# Patient Record
Sex: MALE | Race: WHITE | NOT HISPANIC OR LATINO | URBAN - METROPOLITAN AREA
[De-identification: names, ages, dates, MRNs, and addresses within clinical notes are randomized per-mention and may not be internally consistent; named-entity substitution may affect disease eponyms.]

---

## 2020-06-08 ENCOUNTER — EMERGENCY (EMERGENCY)
Facility: HOSPITAL | Age: 65
LOS: 1 days | Discharge: HOME | End: 2020-06-08
Attending: EMERGENCY MEDICINE
Payer: MEDICARE

## 2020-06-08 VITALS — TEMPERATURE: 100 F

## 2020-06-08 VITALS
TEMPERATURE: 99 F | DIASTOLIC BLOOD PRESSURE: 70 MMHG | SYSTOLIC BLOOD PRESSURE: 130 MMHG | RESPIRATION RATE: 18 BRPM | WEIGHT: 199.96 LBS | HEART RATE: 101 BPM | HEIGHT: 71 IN | OXYGEN SATURATION: 98 %

## 2020-06-08 LAB
ALBUMIN SERPL ELPH-MCNC: 4.1 G/DL — SIGNIFICANT CHANGE UP (ref 3.3–5)
ALP SERPL-CCNC: 76 U/L — SIGNIFICANT CHANGE UP (ref 40–120)
ALT FLD-CCNC: 9 U/L — LOW (ref 10–45)
ANION GAP SERPL CALC-SCNC: 12 MMOL/L — SIGNIFICANT CHANGE UP (ref 5–17)
AST SERPL-CCNC: 10 U/L — SIGNIFICANT CHANGE UP (ref 10–40)
BASOPHILS # BLD AUTO: 0.02 K/UL — SIGNIFICANT CHANGE UP (ref 0–0.2)
BASOPHILS NFR BLD AUTO: 0.2 % — SIGNIFICANT CHANGE UP (ref 0–2)
BILIRUB SERPL-MCNC: 0.4 MG/DL — SIGNIFICANT CHANGE UP (ref 0.2–1.2)
BUN SERPL-MCNC: 24 MG/DL — HIGH (ref 7–23)
CALCIUM SERPL-MCNC: 9.3 MG/DL — SIGNIFICANT CHANGE UP (ref 8.4–10.5)
CHLORIDE SERPL-SCNC: 102 MMOL/L — SIGNIFICANT CHANGE UP (ref 96–108)
CO2 SERPL-SCNC: 25 MMOL/L — SIGNIFICANT CHANGE UP (ref 22–31)
CREAT SERPL-MCNC: 0.81 MG/DL — SIGNIFICANT CHANGE UP (ref 0.5–1.3)
CRP SERPL-MCNC: 7.17 MG/DL — HIGH (ref 0–0.4)
EOSINOPHIL # BLD AUTO: 0.61 K/UL — HIGH (ref 0–0.5)
EOSINOPHIL NFR BLD AUTO: 5.5 % — SIGNIFICANT CHANGE UP (ref 0–6)
GLUCOSE SERPL-MCNC: 205 MG/DL — HIGH (ref 70–99)
HCT VFR BLD CALC: 41.7 % — SIGNIFICANT CHANGE UP (ref 39–50)
HGB BLD-MCNC: 13.7 G/DL — SIGNIFICANT CHANGE UP (ref 13–17)
IMM GRANULOCYTES NFR BLD AUTO: 0.5 % — SIGNIFICANT CHANGE UP (ref 0–1.5)
LYMPHOCYTES # BLD AUTO: 19.7 % — SIGNIFICANT CHANGE UP (ref 13–44)
LYMPHOCYTES # BLD AUTO: 2.18 K/UL — SIGNIFICANT CHANGE UP (ref 1–3.3)
MCHC RBC-ENTMCNC: 29.5 PG — SIGNIFICANT CHANGE UP (ref 27–34)
MCHC RBC-ENTMCNC: 32.9 GM/DL — SIGNIFICANT CHANGE UP (ref 32–36)
MCV RBC AUTO: 89.7 FL — SIGNIFICANT CHANGE UP (ref 80–100)
MONOCYTES # BLD AUTO: 0.87 K/UL — SIGNIFICANT CHANGE UP (ref 0–0.9)
MONOCYTES NFR BLD AUTO: 7.9 % — SIGNIFICANT CHANGE UP (ref 2–14)
NEUTROPHILS # BLD AUTO: 7.3 K/UL — SIGNIFICANT CHANGE UP (ref 1.8–7.4)
NEUTROPHILS NFR BLD AUTO: 66.2 % — SIGNIFICANT CHANGE UP (ref 43–77)
NRBC # BLD: 0 /100 WBCS — SIGNIFICANT CHANGE UP (ref 0–0)
PLATELET # BLD AUTO: 154 K/UL — SIGNIFICANT CHANGE UP (ref 150–400)
POTASSIUM SERPL-MCNC: 4.2 MMOL/L — SIGNIFICANT CHANGE UP (ref 3.5–5.3)
POTASSIUM SERPL-SCNC: 4.2 MMOL/L — SIGNIFICANT CHANGE UP (ref 3.5–5.3)
PROT SERPL-MCNC: 7 G/DL — SIGNIFICANT CHANGE UP (ref 6–8.3)
RBC # BLD: 4.65 M/UL — SIGNIFICANT CHANGE UP (ref 4.2–5.8)
RBC # FLD: 14.3 % — SIGNIFICANT CHANGE UP (ref 10.3–14.5)
SODIUM SERPL-SCNC: 139 MMOL/L — SIGNIFICANT CHANGE UP (ref 135–145)
WBC # BLD: 11.04 K/UL — HIGH (ref 3.8–10.5)
WBC # FLD AUTO: 11.04 K/UL — HIGH (ref 3.8–10.5)

## 2020-06-08 PROCEDURE — 99284 EMERGENCY DEPT VISIT MOD MDM: CPT | Mod: 25

## 2020-06-08 PROCEDURE — 73630 X-RAY EXAM OF FOOT: CPT

## 2020-06-08 PROCEDURE — 85027 COMPLETE CBC AUTOMATED: CPT

## 2020-06-08 PROCEDURE — 86140 C-REACTIVE PROTEIN: CPT

## 2020-06-08 PROCEDURE — 73630 X-RAY EXAM OF FOOT: CPT | Mod: 26,RT

## 2020-06-08 PROCEDURE — 11042 DBRDMT SUBQ TIS 1ST 20SQCM/<: CPT | Mod: RT

## 2020-06-08 PROCEDURE — 99284 EMERGENCY DEPT VISIT MOD MDM: CPT | Mod: GC

## 2020-06-08 PROCEDURE — 82962 GLUCOSE BLOOD TEST: CPT

## 2020-06-08 PROCEDURE — 87040 BLOOD CULTURE FOR BACTERIA: CPT

## 2020-06-08 PROCEDURE — 80053 COMPREHEN METABOLIC PANEL: CPT

## 2020-06-08 NOTE — ED PROVIDER NOTE - PMH
CAD (coronary artery disease)  s/p CABGx1 2009 (LAD)  Diabetes    HLD (hyperlipidemia)    HTN (hypertension)    Marijuana smoker  nightly  Obesity    Smoking  current-[id number]  Spinal stenosis  L5-S1

## 2020-06-08 NOTE — ED PROVIDER NOTE - PHYSICAL EXAMINATION
Gen: No acute distress, alert, cooperative  HENT: Normocephalic, atraumatic.   Eyes: PERRL, EOMI    Resp: CTAB, non-labored, no wheeze  CV: rrr, no murmur  Abd: soft, NTND, no rebound or guarding  MSK: No CVAT bilaterally, no midline ttp  Skin: warm and dry, 2cm x2cm area of firm callous like changes with darkening of skin. No surrounding erythema. Patient unable to feel bottom of foot. No pain with palpation. Erythema to top/lateral side of foot on the right. Patient also has callous in the exact same spot on the left foot, but smaller and no erythema to foot.   Neuro: AOx3, speech is fluent and appropriate, no focal deficits(numbness to bottom of feet, chronic)  Psych: Normal affect

## 2020-06-08 NOTE — ED ADULT NURSE NOTE - OBJECTIVE STATEMENT
Pt is a 65y Male c/o open wound on bottom of R foot with blood in between the pt's 3rd and 4th digits on R foot. Pt PMHx DM with neuropathy, CAD, HTN, HLD, obesity. Pt went to the podiatrist (Raghavendra Tarango) monthly (last time he went was 2 weeks ago). Pt states he has the calluses on the bottom of his feet (callus is in the same place on both feet) shaved down. Pt states he didn't notice this broken skin until today when his daughter noticed it. Pt has reddened top of foot. Pt went to urgent care and was referred here. Pt denies pain, SOB, NVD, dizziness, cough, sick contacts. Pt prefers to go by . Pt resting comfortably in bed with MD at bedside. Pt educated on call bell use and call bell placed at bedside. Pt safety maintained.

## 2020-06-08 NOTE — ED ADULT NURSE REASSESSMENT NOTE - NS ED NURSE REASSESS COMMENT FT1
Pt reporting shivers to RN, MD Cho at bedside. Pt rectal temp 99.6F. 18 gauge placed to left forearm.

## 2020-06-08 NOTE — ED PROVIDER NOTE - OBJECTIVE STATEMENT
66yo M hx DM, diabetic neuropathy presenting with possible foot ulcer. Patient noticed larger callous/ulcer to bottom of right foot just today. Went to urgent care and told to come to ER for evaluation. 66yo M hx DM, diabetic neuropathy presenting with possible foot ulcer. Patient noticed larger callous/ulcer to bottom of right foot just today. Went to urgent care and told to come to ER for evaluation. No fevers. No pain bottom of foot. 66yo M hx DM, diabetic neuropathy presenting with possible foot ulcer. Patient noticed larger callous/ulcer to bottom of right foot just today. Went to urgent care and told to come to ER for evaluation. No fevers. No pain bottom of foot.    Attendinyo male presents with ulceration to the right of the bottom foot.  pt has no sensation to the foot.  no fever.  no drainage from the wound.  pt sees a podiatrist monthly and this was not noted on last visit about 3 weeks ago.

## 2020-06-08 NOTE — CONSULT NOTE ADULT - SUBJECTIVE AND OBJECTIVE BOX
Attending:    Patient is a 65y old  Male who presents with a chief complaint of     HPI:    Review of systems negative except per HPI    PAST MEDICAL & SURGICAL HISTORY:  Marijuana smoker: nightly  Smoking: current-[id number]  Obesity  Spinal stenosis: L5-S1  Diabetes  HLD (hyperlipidemia)  HTN (hypertension)  CAD (coronary artery disease): s/p CABGx1 2009 (LAD)  S/P spinal fusion: 2007    Home Medications:  Aspirin Enteric Coated 81 mg oral delayed release tablet: 1 tab(s) orally once a day (09 Sep 2015 14:21)  Crestor:  orally  (09 Sep 2015 14:21)  Humalog 100 units/mL subcutaneous solution: 10 unit(s) subcutaneous once a day (09 Sep 2015 14:21)  Lantus Solostar Pen 100 units/mL subcutaneous solution: 50 unit(s) subcutaneous once a day (09 Sep 2015 14:21)  metFORMIN: 2000  orally once a day (09 Sep 2015 14:21)  metoprolol succinate 50 mg oral tablet, extended release: 1 tab(s) orally once a day (09 Sep 2015 14:21)  Onglyza 5 mg oral tablet: 1 tab(s) orally once a day (09 Sep 2015 14:21)  ramipril 2.5 mg oral capsule: 1 cap(s) orally once a day (09 Sep 2015 14:21)    Allergies    No Known Allergies    Intolerances      FAMILY HISTORY:    Social History:       LABS                        13.7   11.04 )-----------( 154      ( 08 Jun 2020 21:17 )             41.7     06-08    139  |  102  |  24<H>  ----------------------------<  205<H>  4.2   |  25  |  0.81    Ca    9.3      08 Jun 2020 21:17    TPro  7.0  /  Alb  4.1  /  TBili  0.4  /  DBili  x   /  AST  10  /  ALT  9<L>  /  AlkPhos  76  06-08        Vital Signs Last 24 Hrs  T(C): 37.6 (08 Jun 2020 21:05), Max: 37.6 (08 Jun 2020 21:00)  T(F): 99.6 (08 Jun 2020 21:05), Max: 99.6 (08 Jun 2020 21:00)  HR: 90 (08 Jun 2020 21:00) (90 - 101)  BP: 117/64 (08 Jun 2020 21:00) (117/64 - 130/70)  BP(mean): --  RR: 18 (08 Jun 2020 21:00) (18 - 18)  SpO2: 98% (08 Jun 2020 21:00) (98% - 98%)    PHYSICAL EXAM  General: NAD, AA0x3    Lower Extremity Focused:  Vasc: DP/PT 1/4 b/l, TG warm to cool, R foot mildly edematous,   Neuro: Sensation diminished to level of midfoot b/l   MSK: anterior cavus b/l  Derm: mild forefoot erythema/edema, hyperkeratotic wound sub met 3-4 RF, no drainage, no fluctuance, no crepitation     Wound #1:  Location: RF sub met 3-4  Size: 1.0 cm x 1.0 cm   Etiology: diabetic neuropathy/ chronic pressure   Depth: to subq  Wound bed: fibrogranular   Drainage: none   Odor: mild malodor   Periwound: hyperkeratotic     RADIOLOGY    < from: Xray Foot AP + Lateral + Oblique, Right (06.08.20 @ 20:29) >  EXAM:  FOOT COMPLETE RIGHT (MIN 3 VIEW)                            PROCEDURE DATE:  06/08/2020            INTERPRETATION:  CLINICAL INFORMATION: Right foot pain, evaluate for osteomyelitis.    EXAM: 3 views of the right foot with no prior comparisons.    FINDINGS/  IMPRESSION:  No acute displaced fracture or dislocation. No definite cortical erosions or periosteal reactions to suggest acute osteomyelitis. If there is high clinical suspicion for osteomyelitis, MRI is more sensitive for evaluation.    Posterior calcaneal enthesophyte. Mild diffuse soft tissue swelling. No radiopaque foreign bodies are seen. Vascular calcifications.                ROSINA SANDOVAL M.D., RADIOLOGY RESIDENT  This document has been electronically signed.  ROSINA PEREZ, ATTENDING RADIOLOGIST  This document has been electronically signed. Jun 8 2020  9:01PM              < end of copied text >

## 2020-06-08 NOTE — ED PROVIDER NOTE - CLINICAL SUMMARY MEDICAL DECISION MAKING FREE TEXT BOX
Presents with callous to bottom of foot, partially unroofed, no drainage, no surrounding erythema. Erythema to top/lateral side of foot, likely start of cellulitis. Will get xray, podiatry consult for possible debridement.

## 2020-06-08 NOTE — CONSULT NOTE ADULT - ASSESSMENT
66 y/o M w/ RF sub met 3-4 ulcer to subq    - pt seen in ed and evaluated  - T 99, WBC 11  - RF XR neg for evidence of OM or subq gas  - RF sub met 3-4 diabetic ulcer to subq w/ localized erythema/ edema, mild malodor, no purulence or drainage, no fluctuance or crepitation, no probe to bone   - aseptic excisional debridement of hyperkeratotic lesion down to subq and not beyond utilizing sterile # 15 blade  - applied betadine and DSD  - dispensed surgical shoe   - WBAT to R foot in surgical shoe with weight to heel   - recommend daily dressing changes with mupirocin, however pt insisted on leaving dressing clean, dry and intact until Thursday against advice   - pod stable for d/c on PO Augmentin x 10 days   - pt to return to ED immediately if he notices worsening appearance or constitutional symptoms   - to f/u Thursday with Dr. Srinivasan [phone]  - discussed w/ attending

## 2020-06-08 NOTE — ED PROVIDER NOTE - NSFOLLOWUPINSTRUCTIONS_ED_ALL_ED_FT
-Follow-up with your Podiatrist in the next 3-5 days  -Return to the Emergency Department for any worsening or concerning symptoms such as fevers, severe pain, trouble breathing, weakness or lightheadedness.  -Pickup any prescriptions prescribed to you and take as directed. An antibiotic was sent to your pharmacy.

## 2020-06-08 NOTE — ED ADULT NURSE NOTE - PMH
CAD (coronary artery disease)  s/p CABGx1 2009 (LAD)  Diabetes    HLD (hyperlipidemia)    HTN (hypertension)    Marijuana smoker  nightly  Obesity    Smoking  current-[id number]  Spinal stenosis  L5-S1

## 2020-06-08 NOTE — ED PROVIDER NOTE - PROGRESS NOTE DETAILS
AK: Second page to podiatry. AK: Foot debrided bedside. Agree with podiatry recs for outpt f/u and oral abx.

## 2020-06-08 NOTE — ED PROVIDER NOTE - PATIENT PORTAL LINK FT
You can access the FollowMyHealth Patient Portal offered by [facility] by registering at the following website: [email or web address]. By joining [facility]’s FollowMyHealth portal, you will also be able to view your health information using other applications (apps) compatible with our system.

## 2020-06-14 LAB
CULTURE RESULTS: SIGNIFICANT CHANGE UP
CULTURE RESULTS: SIGNIFICANT CHANGE UP
SPECIMEN SOURCE: SIGNIFICANT CHANGE UP
SPECIMEN SOURCE: SIGNIFICANT CHANGE UP

## 2020-06-21 ENCOUNTER — INPATIENT (INPATIENT)
Facility: HOSPITAL | Age: 65
LOS: 2 days | Discharge: HOME | DRG: 623 | End: 2020-06-24
Attending: HOSPITALIST | Admitting: HOSPITALIST
Payer: MEDICARE

## 2020-06-21 VITALS
TEMPERATURE: 99 F | HEART RATE: 91 BPM | OXYGEN SATURATION: 98 % | WEIGHT: 190.04 LBS | HEIGHT: 71 IN | SYSTOLIC BLOOD PRESSURE: 135 MMHG | RESPIRATION RATE: 18 BRPM | DIASTOLIC BLOOD PRESSURE: 80 MMHG

## 2020-06-21 LAB
ALBUMIN SERPL ELPH-MCNC: 3.7 G/DL — SIGNIFICANT CHANGE UP (ref 3.3–5)
ALP SERPL-CCNC: 75 U/L — SIGNIFICANT CHANGE UP (ref 40–120)
ALT FLD-CCNC: 6 U/L — LOW (ref 10–45)
ANION GAP SERPL CALC-SCNC: 10 MMOL/L — SIGNIFICANT CHANGE UP (ref 5–17)
AST SERPL-CCNC: 9 U/L — LOW (ref 10–40)
BASE EXCESS BLDV CALC-SCNC: 3.4 MMOL/L — HIGH (ref -2–2)
BASOPHILS # BLD AUTO: 0.03 K/UL — SIGNIFICANT CHANGE UP (ref 0–0.2)
BASOPHILS NFR BLD AUTO: 0.3 % — SIGNIFICANT CHANGE UP (ref 0–2)
BILIRUB SERPL-MCNC: 0.2 MG/DL — SIGNIFICANT CHANGE UP (ref 0.2–1.2)
BUN SERPL-MCNC: 21 MG/DL — SIGNIFICANT CHANGE UP (ref 7–23)
CA-I SERPL-SCNC: 1.14 MMOL/L — SIGNIFICANT CHANGE UP (ref 1.12–1.3)
CALCIUM SERPL-MCNC: 9 MG/DL — SIGNIFICANT CHANGE UP (ref 8.4–10.5)
CHLORIDE BLDV-SCNC: 106 MMOL/L — SIGNIFICANT CHANGE UP (ref 96–108)
CHLORIDE SERPL-SCNC: 105 MMOL/L — SIGNIFICANT CHANGE UP (ref 96–108)
CO2 BLDV-SCNC: 29 MMOL/L — SIGNIFICANT CHANGE UP (ref 22–30)
CO2 SERPL-SCNC: 24 MMOL/L — SIGNIFICANT CHANGE UP (ref 22–31)
CREAT SERPL-MCNC: 0.7 MG/DL — SIGNIFICANT CHANGE UP (ref 0.5–1.3)
CRP SERPL-MCNC: 1.18 MG/DL — HIGH (ref 0–0.4)
EOSINOPHIL # BLD AUTO: 0.34 K/UL — SIGNIFICANT CHANGE UP (ref 0–0.5)
EOSINOPHIL NFR BLD AUTO: 3.5 % — SIGNIFICANT CHANGE UP (ref 0–6)
GAS PNL BLDV: 138 MMOL/L — SIGNIFICANT CHANGE UP (ref 135–145)
GAS PNL BLDV: SIGNIFICANT CHANGE UP
GAS PNL BLDV: SIGNIFICANT CHANGE UP
GLUCOSE BLDV-MCNC: 317 MG/DL — HIGH (ref 70–99)
GLUCOSE SERPL-MCNC: 335 MG/DL — HIGH (ref 70–99)
HCO3 BLDV-SCNC: 28 MMOL/L — SIGNIFICANT CHANGE UP (ref 21–29)
HCT VFR BLD CALC: 39.3 % — SIGNIFICANT CHANGE UP (ref 39–50)
HCT VFR BLDA CALC: 41 % — SIGNIFICANT CHANGE UP (ref 39–50)
HGB BLD CALC-MCNC: 13.4 G/DL — SIGNIFICANT CHANGE UP (ref 13–17)
HGB BLD-MCNC: 12.5 G/DL — LOW (ref 13–17)
IMM GRANULOCYTES NFR BLD AUTO: 0.4 % — SIGNIFICANT CHANGE UP (ref 0–1.5)
LACTATE BLDV-MCNC: 2 MMOL/L — SIGNIFICANT CHANGE UP (ref 0.7–2)
LYMPHOCYTES # BLD AUTO: 1.98 K/UL — SIGNIFICANT CHANGE UP (ref 1–3.3)
LYMPHOCYTES # BLD AUTO: 20.2 % — SIGNIFICANT CHANGE UP (ref 13–44)
MCHC RBC-ENTMCNC: 28.7 PG — SIGNIFICANT CHANGE UP (ref 27–34)
MCHC RBC-ENTMCNC: 31.8 GM/DL — LOW (ref 32–36)
MCV RBC AUTO: 90.3 FL — SIGNIFICANT CHANGE UP (ref 80–100)
MONOCYTES # BLD AUTO: 0.74 K/UL — SIGNIFICANT CHANGE UP (ref 0–0.9)
MONOCYTES NFR BLD AUTO: 7.6 % — SIGNIFICANT CHANGE UP (ref 2–14)
NEUTROPHILS # BLD AUTO: 6.65 K/UL — SIGNIFICANT CHANGE UP (ref 1.8–7.4)
NEUTROPHILS NFR BLD AUTO: 68 % — SIGNIFICANT CHANGE UP (ref 43–77)
NRBC # BLD: 0 /100 WBCS — SIGNIFICANT CHANGE UP (ref 0–0)
PCO2 BLDV: 42 MMHG — SIGNIFICANT CHANGE UP (ref 35–50)
PH BLDV: 7.43 — SIGNIFICANT CHANGE UP (ref 7.35–7.45)
PLATELET # BLD AUTO: 149 K/UL — LOW (ref 150–400)
PO2 BLDV: 39 MMHG — SIGNIFICANT CHANGE UP (ref 25–45)
POTASSIUM BLDV-SCNC: 4.8 MMOL/L — SIGNIFICANT CHANGE UP (ref 3.5–5.3)
POTASSIUM SERPL-MCNC: 4.5 MMOL/L — SIGNIFICANT CHANGE UP (ref 3.5–5.3)
POTASSIUM SERPL-SCNC: 4.5 MMOL/L — SIGNIFICANT CHANGE UP (ref 3.5–5.3)
PROT SERPL-MCNC: 6.1 G/DL — SIGNIFICANT CHANGE UP (ref 6–8.3)
RBC # BLD: 4.35 M/UL — SIGNIFICANT CHANGE UP (ref 4.2–5.8)
RBC # FLD: 14.1 % — SIGNIFICANT CHANGE UP (ref 10.3–14.5)
SAO2 % BLDV: 79 % — SIGNIFICANT CHANGE UP (ref 67–88)
SODIUM SERPL-SCNC: 139 MMOL/L — SIGNIFICANT CHANGE UP (ref 135–145)
WBC # BLD: 9.78 K/UL — SIGNIFICANT CHANGE UP (ref 3.8–10.5)
WBC # FLD AUTO: 9.78 K/UL — SIGNIFICANT CHANGE UP (ref 3.8–10.5)

## 2020-06-21 PROCEDURE — 99285 EMERGENCY DEPT VISIT HI MDM: CPT | Mod: CS,GC

## 2020-06-21 PROCEDURE — 99406 BEHAV CHNG SMOKING 3-10 MIN: CPT

## 2020-06-21 PROCEDURE — 73630 X-RAY EXAM OF FOOT: CPT | Mod: 26,RT

## 2020-06-21 PROCEDURE — 71045 X-RAY EXAM CHEST 1 VIEW: CPT | Mod: 26

## 2020-06-21 PROCEDURE — 93010 ELECTROCARDIOGRAM REPORT: CPT | Mod: GC

## 2020-06-21 PROCEDURE — 99223 1ST HOSP IP/OBS HIGH 75: CPT

## 2020-06-21 RX ORDER — SODIUM CHLORIDE 9 MG/ML
1000 INJECTION INTRAMUSCULAR; INTRAVENOUS; SUBCUTANEOUS ONCE
Refills: 0 | Status: COMPLETED | OUTPATIENT
Start: 2020-06-21 | End: 2020-06-21

## 2020-06-21 RX ORDER — CEFEPIME 1 G/1
2000 INJECTION, POWDER, FOR SOLUTION INTRAMUSCULAR; INTRAVENOUS ONCE
Refills: 0 | Status: COMPLETED | OUTPATIENT
Start: 2020-06-21 | End: 2020-06-21

## 2020-06-21 RX ORDER — VANCOMYCIN HCL 1 G
1000 VIAL (EA) INTRAVENOUS ONCE
Refills: 0 | Status: COMPLETED | OUTPATIENT
Start: 2020-06-21 | End: 2020-06-22

## 2020-06-21 RX ADMIN — SODIUM CHLORIDE 1000 MILLILITER(S): 9 INJECTION INTRAMUSCULAR; INTRAVENOUS; SUBCUTANEOUS at 22:52

## 2020-06-21 RX ADMIN — CEFEPIME 100 MILLIGRAM(S): 1 INJECTION, POWDER, FOR SOLUTION INTRAMUSCULAR; INTRAVENOUS at 23:47

## 2020-06-21 NOTE — ED ADULT NURSE NOTE - OBJECTIVE STATEMENT
65YOM pmh DM presents to ED c/o R foot infection. Pt was seen on 6/8 and sent home on antibiotics but now the infection is worsening. Pt now has developed a blister on R foot. Denies trauma to the area. Safety and comfort measures provided. Will continue to monitor.

## 2020-06-21 NOTE — ED PROVIDER NOTE - CLINICAL SUMMARY MEDICAL DECISION MAKING FREE TEXT BOX
65M presents with new foot infection in setting of diabetic foot ulcers, failing outpatient abx. Will evaluate for osteo, podiatry to see. May require admission if failing PO abx. Reassess after labs imaging.

## 2020-06-21 NOTE — ED PROVIDER NOTE - PMH
CAD (coronary artery disease)  s/p CABGx1 2009 (LAD)  Diabetes    HLD (hyperlipidemia)    HTN (hypertension)    Marijuana smoker  nightly  Obesity    Smoking  current-[id number]  Spinal stenosis  L5-S1

## 2020-06-21 NOTE — ED PROVIDER NOTE - ATTENDING CONTRIBUTION TO CARE
65 M w/ pmh of DM, presents to the ED w/ worsening redness of the R foot w/ overlying blisters recent tx w/ augmentin w/ 2+ dP pulses

## 2020-06-21 NOTE — ED PROVIDER NOTE - NS ED ROS FT
REVIEW OF SYSTEMS:  General:  no fever, no chills  HEENT: no headache, no vision changes  Cardiac: no chest pain, no palpitations  Respiratory: no cough, no shortness of breath  Gastrointestinal: no abdominal pain, no nausea, no vomiting, no diarrhea  Genitourinary: no hematuria, no dysuria, no urinary frequency  Extremities: no extremity swelling, no extremity pain  Neuro: no focal weakness, no numbness/tingling of the extremities, +decreased sensation in feet   Heme: no easy bleeding, no easy bruising  Skin: no jaundice,  no rashes, +blister and redness on R foot   All other ROS as documented in HPI  -Ketan Sanchez, PGY-2

## 2020-06-21 NOTE — ED PROVIDER NOTE - OBJECTIVE STATEMENT
65M PMH DM presents with foot infection. Pt was recently seen in 6/08 for foot infection, had bedside wound debridement by podiatry and was discharged on PO Augmentin. Pt followed up with podiatry on Friday. Over the weekend he developed a blister on the top of his R foot that popped today. Came into ER for further evaluation. Denies fevers/chills.

## 2020-06-21 NOTE — ED PROVIDER NOTE - PHYSICAL EXAMINATION
General: Well developed, well nourished  HEENT: Normocephalic and atraumatic, Trachea midline.   Cardiac: Normal S1 and S2 w/ RRR. No MRG.  Pulmonary: Mild diffuse wheezing. No increased WOB.   Abdominal: Soft, NTND  Neurologic: No focal sensory or motor deficits.  Musculoskeletal: No limited ROM.  Vascular: DP pulses 2+ and equal   Skin: R foot has calloused ulcer on plantar aspect of foot. On dorsal aspect there is new blister with drainage of serosang fluid with surrounding erythema, and calor. R foot has 1+ pitting edema. L foot has calloused ulcer on plantar aspect as well but no signs of active infection.  Psychiatric: Appropriate mood and affect. No apparent risk to self or others.  Ketan Sanchez M.D. PGY-2

## 2020-06-22 ENCOUNTER — TRANSCRIPTION ENCOUNTER (OUTPATIENT)
Age: 65
End: 2020-06-22

## 2020-06-22 DIAGNOSIS — E11.69 TYPE 2 DIABETES MELLITUS WITH OTHER SPECIFIED COMPLICATION: ICD-10-CM

## 2020-06-22 DIAGNOSIS — I10 ESSENTIAL (PRIMARY) HYPERTENSION: ICD-10-CM

## 2020-06-22 DIAGNOSIS — L03.90 CELLULITIS, UNSPECIFIED: ICD-10-CM

## 2020-06-22 DIAGNOSIS — E11.628 TYPE 2 DIABETES MELLITUS WITH OTHER SKIN COMPLICATIONS: ICD-10-CM

## 2020-06-22 DIAGNOSIS — Z29.9 ENCOUNTER FOR PROPHYLACTIC MEASURES, UNSPECIFIED: ICD-10-CM

## 2020-06-22 DIAGNOSIS — I25.10 ATHEROSCLEROTIC HEART DISEASE OF NATIVE CORONARY ARTERY WITHOUT ANGINA PECTORIS: ICD-10-CM

## 2020-06-22 DIAGNOSIS — Z79.899 OTHER LONG TERM (CURRENT) DRUG THERAPY: ICD-10-CM

## 2020-06-22 DIAGNOSIS — F17.200 NICOTINE DEPENDENCE, UNSPECIFIED, UNCOMPLICATED: ICD-10-CM

## 2020-06-22 LAB
ANION GAP SERPL CALC-SCNC: 7 MMOL/L — SIGNIFICANT CHANGE UP (ref 5–17)
BASOPHILS # BLD AUTO: 0.03 K/UL — SIGNIFICANT CHANGE UP (ref 0–0.2)
BASOPHILS NFR BLD AUTO: 0.3 % — SIGNIFICANT CHANGE UP (ref 0–2)
BUN SERPL-MCNC: 17 MG/DL — SIGNIFICANT CHANGE UP (ref 7–23)
CALCIUM SERPL-MCNC: 8.6 MG/DL — SIGNIFICANT CHANGE UP (ref 8.4–10.5)
CHLORIDE SERPL-SCNC: 106 MMOL/L — SIGNIFICANT CHANGE UP (ref 96–108)
CO2 SERPL-SCNC: 26 MMOL/L — SIGNIFICANT CHANGE UP (ref 22–31)
CREAT SERPL-MCNC: 0.7 MG/DL — SIGNIFICANT CHANGE UP (ref 0.5–1.3)
EOSINOPHIL # BLD AUTO: 0.39 K/UL — SIGNIFICANT CHANGE UP (ref 0–0.5)
EOSINOPHIL NFR BLD AUTO: 4.4 % — SIGNIFICANT CHANGE UP (ref 0–6)
GLUCOSE BLDC GLUCOMTR-MCNC: 136 MG/DL — HIGH (ref 70–99)
GLUCOSE BLDC GLUCOMTR-MCNC: 173 MG/DL — HIGH (ref 70–99)
GLUCOSE BLDC GLUCOMTR-MCNC: 174 MG/DL — HIGH (ref 70–99)
GLUCOSE BLDC GLUCOMTR-MCNC: 223 MG/DL — HIGH (ref 70–99)
GLUCOSE BLDC GLUCOMTR-MCNC: 275 MG/DL — HIGH (ref 70–99)
GLUCOSE SERPL-MCNC: 236 MG/DL — HIGH (ref 70–99)
HCT VFR BLD CALC: 40.2 % — SIGNIFICANT CHANGE UP (ref 39–50)
HGB BLD-MCNC: 12.8 G/DL — LOW (ref 13–17)
IMM GRANULOCYTES NFR BLD AUTO: 0.6 % — SIGNIFICANT CHANGE UP (ref 0–1.5)
LYMPHOCYTES # BLD AUTO: 2.33 K/UL — SIGNIFICANT CHANGE UP (ref 1–3.3)
LYMPHOCYTES # BLD AUTO: 26.1 % — SIGNIFICANT CHANGE UP (ref 13–44)
MAGNESIUM SERPL-MCNC: 2.2 MG/DL — SIGNIFICANT CHANGE UP (ref 1.6–2.6)
MCHC RBC-ENTMCNC: 28.9 PG — SIGNIFICANT CHANGE UP (ref 27–34)
MCHC RBC-ENTMCNC: 31.8 GM/DL — LOW (ref 32–36)
MCV RBC AUTO: 90.7 FL — SIGNIFICANT CHANGE UP (ref 80–100)
MONOCYTES # BLD AUTO: 0.64 K/UL — SIGNIFICANT CHANGE UP (ref 0–0.9)
MONOCYTES NFR BLD AUTO: 7.2 % — SIGNIFICANT CHANGE UP (ref 2–14)
MRSA PCR RESULT.: SIGNIFICANT CHANGE UP
NEUTROPHILS # BLD AUTO: 5.49 K/UL — SIGNIFICANT CHANGE UP (ref 1.8–7.4)
NEUTROPHILS NFR BLD AUTO: 61.4 % — SIGNIFICANT CHANGE UP (ref 43–77)
NRBC # BLD: 0 /100 WBCS — SIGNIFICANT CHANGE UP (ref 0–0)
PHOSPHATE SERPL-MCNC: 2.9 MG/DL — SIGNIFICANT CHANGE UP (ref 2.5–4.5)
PLATELET # BLD AUTO: 145 K/UL — LOW (ref 150–400)
POTASSIUM SERPL-MCNC: 4.2 MMOL/L — SIGNIFICANT CHANGE UP (ref 3.5–5.3)
POTASSIUM SERPL-SCNC: 4.2 MMOL/L — SIGNIFICANT CHANGE UP (ref 3.5–5.3)
RBC # BLD: 4.43 M/UL — SIGNIFICANT CHANGE UP (ref 4.2–5.8)
RBC # FLD: 14 % — SIGNIFICANT CHANGE UP (ref 10.3–14.5)
S AUREUS DNA NOSE QL NAA+PROBE: SIGNIFICANT CHANGE UP
SARS-COV-2 RNA SPEC QL NAA+PROBE: SIGNIFICANT CHANGE UP
SODIUM SERPL-SCNC: 139 MMOL/L — SIGNIFICANT CHANGE UP (ref 135–145)
WBC # BLD: 8.93 K/UL — SIGNIFICANT CHANGE UP (ref 3.8–10.5)
WBC # FLD AUTO: 8.93 K/UL — SIGNIFICANT CHANGE UP (ref 3.8–10.5)

## 2020-06-22 PROCEDURE — 73720 MRI LWR EXTREMITY W/O&W/DYE: CPT | Mod: 26,RT

## 2020-06-22 PROCEDURE — 99223 1ST HOSP IP/OBS HIGH 75: CPT | Mod: GC

## 2020-06-22 PROCEDURE — 99233 SBSQ HOSP IP/OBS HIGH 50: CPT

## 2020-06-22 PROCEDURE — 93923 UPR/LXTR ART STDY 3+ LVLS: CPT | Mod: 26

## 2020-06-22 RX ORDER — DEXTROSE 50 % IN WATER 50 %
25 SYRINGE (ML) INTRAVENOUS ONCE
Refills: 0 | Status: DISCONTINUED | OUTPATIENT
Start: 2020-06-22 | End: 2020-06-24

## 2020-06-22 RX ORDER — INSULIN GLARGINE 100 [IU]/ML
50 INJECTION, SOLUTION SUBCUTANEOUS AT BEDTIME
Refills: 0 | Status: DISCONTINUED | OUTPATIENT
Start: 2020-06-22 | End: 2020-06-22

## 2020-06-22 RX ORDER — DEXTROSE 50 % IN WATER 50 %
15 SYRINGE (ML) INTRAVENOUS ONCE
Refills: 0 | Status: DISCONTINUED | OUTPATIENT
Start: 2020-06-22 | End: 2020-06-24

## 2020-06-22 RX ORDER — VANCOMYCIN HCL 1 G
1000 VIAL (EA) INTRAVENOUS EVERY 12 HOURS
Refills: 0 | Status: DISCONTINUED | OUTPATIENT
Start: 2020-06-22 | End: 2020-06-23

## 2020-06-22 RX ORDER — CEFEPIME 1 G/1
2000 INJECTION, POWDER, FOR SOLUTION INTRAMUSCULAR; INTRAVENOUS EVERY 8 HOURS
Refills: 0 | Status: DISCONTINUED | OUTPATIENT
Start: 2020-06-22 | End: 2020-06-22

## 2020-06-22 RX ORDER — INSULIN GLARGINE 100 [IU]/ML
60 INJECTION, SOLUTION SUBCUTANEOUS AT BEDTIME
Refills: 0 | Status: DISCONTINUED | OUTPATIENT
Start: 2020-06-22 | End: 2020-06-24

## 2020-06-22 RX ORDER — ASPIRIN/CALCIUM CARB/MAGNESIUM 324 MG
81 TABLET ORAL DAILY
Refills: 0 | Status: DISCONTINUED | OUTPATIENT
Start: 2020-06-22 | End: 2020-06-24

## 2020-06-22 RX ORDER — SEMAGLUTIDE 0.68 MG/ML
0 INJECTION, SOLUTION SUBCUTANEOUS
Qty: 6 | Refills: 0 | DISCHARGE

## 2020-06-22 RX ORDER — SODIUM CHLORIDE 9 MG/ML
1000 INJECTION, SOLUTION INTRAVENOUS
Refills: 0 | Status: DISCONTINUED | OUTPATIENT
Start: 2020-06-22 | End: 2020-06-24

## 2020-06-22 RX ORDER — METOPROLOL TARTRATE 50 MG
25 TABLET ORAL DAILY
Refills: 0 | Status: DISCONTINUED | OUTPATIENT
Start: 2020-06-22 | End: 2020-06-24

## 2020-06-22 RX ORDER — INSULIN LISPRO 100/ML
VIAL (ML) SUBCUTANEOUS
Refills: 0 | Status: DISCONTINUED | OUTPATIENT
Start: 2020-06-22 | End: 2020-06-24

## 2020-06-22 RX ORDER — INSULIN DEGLUDEC 100 U/ML
0 INJECTION, SOLUTION SUBCUTANEOUS
Qty: 27 | Refills: 0 | DISCHARGE

## 2020-06-22 RX ORDER — GLUCAGON INJECTION, SOLUTION 0.5 MG/.1ML
1 INJECTION, SOLUTION SUBCUTANEOUS ONCE
Refills: 0 | Status: DISCONTINUED | OUTPATIENT
Start: 2020-06-22 | End: 2020-06-24

## 2020-06-22 RX ORDER — ACETAMINOPHEN 500 MG
650 TABLET ORAL EVERY 6 HOURS
Refills: 0 | Status: DISCONTINUED | OUTPATIENT
Start: 2020-06-22 | End: 2020-06-24

## 2020-06-22 RX ORDER — CEFEPIME 1 G/1
1000 INJECTION, POWDER, FOR SOLUTION INTRAMUSCULAR; INTRAVENOUS EVERY 8 HOURS
Refills: 0 | Status: DISCONTINUED | OUTPATIENT
Start: 2020-06-22 | End: 2020-06-24

## 2020-06-22 RX ORDER — INSULIN LISPRO 100/ML
VIAL (ML) SUBCUTANEOUS AT BEDTIME
Refills: 0 | Status: DISCONTINUED | OUTPATIENT
Start: 2020-06-22 | End: 2020-06-24

## 2020-06-22 RX ORDER — FUROSEMIDE 40 MG
0 TABLET ORAL
Qty: 90 | Refills: 0 | DISCHARGE

## 2020-06-22 RX ORDER — TAMSULOSIN HYDROCHLORIDE 0.4 MG/1
0 CAPSULE ORAL
Qty: 30 | Refills: 0 | DISCHARGE

## 2020-06-22 RX ORDER — ATORVASTATIN CALCIUM 80 MG/1
80 TABLET, FILM COATED ORAL AT BEDTIME
Refills: 0 | Status: DISCONTINUED | OUTPATIENT
Start: 2020-06-22 | End: 2020-06-24

## 2020-06-22 RX ORDER — INSULIN LISPRO 100/ML
8 VIAL (ML) SUBCUTANEOUS
Refills: 0 | Status: DISCONTINUED | OUTPATIENT
Start: 2020-06-22 | End: 2020-06-24

## 2020-06-22 RX ORDER — VANCOMYCIN HCL 1 G
1000 VIAL (EA) INTRAVENOUS EVERY 12 HOURS
Refills: 0 | Status: DISCONTINUED | OUTPATIENT
Start: 2020-06-22 | End: 2020-06-22

## 2020-06-22 RX ORDER — DEXTROSE 50 % IN WATER 50 %
12.5 SYRINGE (ML) INTRAVENOUS ONCE
Refills: 0 | Status: DISCONTINUED | OUTPATIENT
Start: 2020-06-22 | End: 2020-06-24

## 2020-06-22 RX ORDER — NYSTATIN CREAM 100000 [USP'U]/G
1 CREAM TOPICAL EVERY 12 HOURS
Refills: 0 | Status: DISCONTINUED | OUTPATIENT
Start: 2020-06-22 | End: 2020-06-24

## 2020-06-22 RX ORDER — LISINOPRIL 2.5 MG/1
10 TABLET ORAL DAILY
Refills: 0 | Status: DISCONTINUED | OUTPATIENT
Start: 2020-06-22 | End: 2020-06-23

## 2020-06-22 RX ORDER — TAMSULOSIN HYDROCHLORIDE 0.4 MG/1
0.4 CAPSULE ORAL AT BEDTIME
Refills: 0 | Status: DISCONTINUED | OUTPATIENT
Start: 2020-06-22 | End: 2020-06-24

## 2020-06-22 RX ORDER — LANOLIN ALCOHOL/MO/W.PET/CERES
5 CREAM (GRAM) TOPICAL ONCE
Refills: 0 | Status: COMPLETED | OUTPATIENT
Start: 2020-06-22 | End: 2020-06-22

## 2020-06-22 RX ORDER — HEPARIN SODIUM 5000 [USP'U]/ML
5000 INJECTION INTRAVENOUS; SUBCUTANEOUS EVERY 8 HOURS
Refills: 0 | Status: DISCONTINUED | OUTPATIENT
Start: 2020-06-22 | End: 2020-06-24

## 2020-06-22 RX ORDER — INSULIN GLARGINE 100 [IU]/ML
50 INJECTION, SOLUTION SUBCUTANEOUS ONCE
Refills: 0 | Status: COMPLETED | OUTPATIENT
Start: 2020-06-22 | End: 2020-06-22

## 2020-06-22 RX ADMIN — Medication 1: at 17:41

## 2020-06-22 RX ADMIN — Medication 25 MILLIGRAM(S): at 14:47

## 2020-06-22 RX ADMIN — Medication 2: at 08:41

## 2020-06-22 RX ADMIN — Medication 8 UNIT(S): at 08:41

## 2020-06-22 RX ADMIN — ATORVASTATIN CALCIUM 80 MILLIGRAM(S): 80 TABLET, FILM COATED ORAL at 21:27

## 2020-06-22 RX ADMIN — Medication 250 MILLIGRAM(S): at 01:16

## 2020-06-22 RX ADMIN — HEPARIN SODIUM 5000 UNIT(S): 5000 INJECTION INTRAVENOUS; SUBCUTANEOUS at 05:15

## 2020-06-22 RX ADMIN — HEPARIN SODIUM 5000 UNIT(S): 5000 INJECTION INTRAVENOUS; SUBCUTANEOUS at 14:36

## 2020-06-22 RX ADMIN — HEPARIN SODIUM 5000 UNIT(S): 5000 INJECTION INTRAVENOUS; SUBCUTANEOUS at 21:28

## 2020-06-22 RX ADMIN — CEFEPIME 100 MILLIGRAM(S): 1 INJECTION, POWDER, FOR SOLUTION INTRAMUSCULAR; INTRAVENOUS at 21:28

## 2020-06-22 RX ADMIN — LISINOPRIL 10 MILLIGRAM(S): 2.5 TABLET ORAL at 14:47

## 2020-06-22 RX ADMIN — CEFEPIME 100 MILLIGRAM(S): 1 INJECTION, POWDER, FOR SOLUTION INTRAMUSCULAR; INTRAVENOUS at 08:47

## 2020-06-22 RX ADMIN — INSULIN GLARGINE 60 UNIT(S): 100 INJECTION, SOLUTION SUBCUTANEOUS at 21:40

## 2020-06-22 RX ADMIN — TAMSULOSIN HYDROCHLORIDE 0.4 MILLIGRAM(S): 0.4 CAPSULE ORAL at 21:27

## 2020-06-22 RX ADMIN — Medication 5 MILLIGRAM(S): at 21:27

## 2020-06-22 RX ADMIN — Medication 250 MILLIGRAM(S): at 14:35

## 2020-06-22 RX ADMIN — Medication 81 MILLIGRAM(S): at 17:40

## 2020-06-22 RX ADMIN — INSULIN GLARGINE 50 UNIT(S): 100 INJECTION, SOLUTION SUBCUTANEOUS at 01:29

## 2020-06-22 RX ADMIN — Medication 150 MILLIGRAM(S): at 21:27

## 2020-06-22 RX ADMIN — Medication 8 UNIT(S): at 17:41

## 2020-06-22 RX ADMIN — Medication 650 MILLIGRAM(S): at 20:02

## 2020-06-22 RX ADMIN — Medication 8 UNIT(S): at 13:32

## 2020-06-22 RX ADMIN — Medication 650 MILLIGRAM(S): at 14:48

## 2020-06-22 RX ADMIN — NYSTATIN CREAM 1 APPLICATION(S): 100000 CREAM TOPICAL at 21:29

## 2020-06-22 NOTE — DISCHARGE NOTE NURSING/CASE MANAGEMENT/SOCIAL WORK - NSTOBACCONEVERSMOKERY/N_GEN_A
Please contact the patient and tell him/her that I would like to do a telephone visit or video visit (preferred option if he/she has MyChart). This is a virtual visit and is similar to being seen in the office.  We can still cover pertinent medical concerns No

## 2020-06-22 NOTE — PHARMACOTHERAPY INTERVENTION NOTE - COMMENTS
Patient with pending A1c on Tresiba 80 units and Humalog 10 units three times daily at home, on vancomycin 1 g every 12 hours and cefepime 2 g every 8 hours in D5W for diabetic foot infection. Suggested switching base solution to NS.    Chelsea Evans, PharmD  PGY-1 Pharmacy Resident  [phone] Patient with pending A1c on Tresiba 80 units and Humalog 10 units three times daily at home, on vancomycin 1 g every 12 hours and cefepime 2 g every 8 hours in D5W for diabetic foot infection. BG 6/21: 246, 275; FBG 6/22: 223. Suggested switching base solution to NS.    Confirmed home medications with patient and pharmacy, updated in Outpatient Medication Review. Suggested adding the following home medications not ordered inpatient: aspirin 81 mg once daily, tamsulosin 0.4 mg once daily, and pregabalin 150 mg three times daily.     Chelsea Evans, PharmD  PGY-1 Pharmacy Resident  [phone]

## 2020-06-22 NOTE — DISCHARGE NOTE NURSING/CASE MANAGEMENT/SOCIAL WORK - NSDCPNINST_GEN_ALL_CORE
RN reviewed wound care instructions with patient. RN reviewed how to clean wound and change dressings. Patient verbalized understanding. Patient ordered discharged. RN provided patient with discharge instructions. RN reviewed all follow up appointments and medications. RN answered all questions. RN provided wound care instructions to patient, how to cleanse wrapp foot. RN provided patient with additional supplies. Patient verbalized understanding. Patient discharged.

## 2020-06-22 NOTE — H&P ADULT - PROBLEM SELECTOR PLAN 2
Hold Metformin  Patient states on Tresiba 80 U qHS Will initiate Lantus 50 qhs for tonight  Premeal Humalog of 8U TID  Corrective SSI  Monitor FS  Endo consult per primary team

## 2020-06-22 NOTE — PROGRESS NOTE ADULT - SUBJECTIVE AND OBJECTIVE BOX
[facility] Division of Hospital Medicine  Caren Aaron MD  Pager (M-F, 1H-8M): [phone]  Other Times:  [phone]      Patient is a 65y old  Male who presents with a chief complaint of right foot diabetic ulcer (22 Jun 2020 09:40)      SUBJECTIVE / OVERNIGHT EVENTS: patient seen and examined this AM prior to being transported to [facility]. no acute events overnight. denies any fevers, chills, nausea nor vomiting.     ADDITIONAL REVIEW OF SYSTEMS:    MEDICATIONS  (STANDING):  cefepime   IVPB 2000 milliGRAM(s) IV Intermittent every 8 hours  dextrose 5%. 1000 milliLiter(s) (50 mL/Hr) IV Continuous <Continuous>  dextrose 50% Injectable 12.5 Gram(s) IV Push once  dextrose 50% Injectable 25 Gram(s) IV Push once  dextrose 50% Injectable 25 Gram(s) IV Push once  heparin   Injectable 5000 Unit(s) SubCutaneous every 8 hours  insulin glargine Injectable (LANTUS) 50 Unit(s) SubCutaneous at bedtime  insulin lispro (HumaLOG) corrective regimen sliding scale   SubCutaneous three times a day before meals  insulin lispro (HumaLOG) corrective regimen sliding scale   SubCutaneous at bedtime  insulin lispro Injectable (HumaLOG) 8 Unit(s) SubCutaneous three times a day before meals  vancomycin  IVPB 1000 milliGRAM(s) IV Intermittent every 12 hours    MEDICATIONS  (PRN):  dextrose 40% Gel 15 Gram(s) Oral once PRN Blood Glucose LESS THAN 70 milliGRAM(s)/deciliter  glucagon  Injectable 1 milliGRAM(s) IntraMuscular once PRN Glucose LESS THAN 70 milligrams/deciliter      CAPILLARY BLOOD GLUCOSE      POCT Blood Glucose.: 136 mg/dL (22 Jun 2020 13:16)  POCT Blood Glucose.: 223 mg/dL (22 Jun 2020 08:16)  POCT Blood Glucose.: 275 mg/dL (22 Jun 2020 01:26)    I&O's Summary    21 Jun 2020 07:01  -  22 Jun 2020 07:00  --------------------------------------------------------  IN: 0 mL / OUT: 600 mL / NET: -600 mL    22 Jun 2020 07:01  -  22 Jun 2020 14:03  --------------------------------------------------------  IN: 290 mL / OUT: 0 mL / NET: 290 mL        PHYSICAL EXAM:  Vital Signs Last 24 Hrs  T(C): 36.9 (22 Jun 2020 13:43), Max: 37.2 (21 Jun 2020 21:34)  T(F): 98.4 (22 Jun 2020 13:43), Max: 98.9 (21 Jun 2020 21:34)  HR: 83 (22 Jun 2020 13:43) (72 - 91)  BP: 126/74 (22 Jun 2020 13:43) (106/70 - 135/80)  BP(mean): --  RR: 18 (22 Jun 2020 13:43) (18 - 18)  SpO2: 98% (22 Jun 2020 13:43) (96% - 98%)    CONSTITUTIONAL: NAD, well-developed, well-groomed  EYES: PERRLA; conjunctiva and sclera clear  ENMT: Moist oral mucosa  Supple, no palpable masses; no thyromegaly  RESPIRATORY: Normal respiratory effort; lungs are clear to auscultation bilaterally  CARDIOVASCULAR: Regular rate and rhythm, normal S1 and S2, no murmur/rub/gallop; No lower extremity edema  ABDOMEN: Soft, Nondistended,  Nontender to palpation, normoactive bowel sounds  MUSCULOSKELETAL:  No clubbing or cyanosis of digits; no joint swelling or tenderness to palpation  PSYCH: A+O to person, place, and time; affect appropriate  NEUROLOGY: CN 2-12 are intact and symmetric; no gross sensory deficits   SKIN: +right foot dressed by podiatry, refer to podiatry exam    LABS:                        12.8   8.93  )-----------( 145      ( 22 Jun 2020 06:32 )             40.2     06-22    139  |  106  |  17  ----------------------------<  236<H>  4.2   |  26  |  0.70    Ca    8.6      22 Jun 2020 06:32  Phos  2.9     06-22  Mg     2.2     06-22    TPro  6.1  /  Alb  3.7  /  TBili  0.2  /  DBili  x   /  AST  9<L>  /  ALT  6<L>  /  AlkPhos  75  06-21        RADIOLOGY & ADDITIONAL TESTS:  Results Reviewed:  no leukocytosis, BUN/Cr stable, hyperglycemia, lytes wnl  Imaging Personally Reviewed:   Electrocardiogram Personally Reviewed:    COORDINATION OF CARE:  Care Discussed with Consultants/Other Providers [Y]: medicine NP Maicol  Prior or Outpatient Records Reviewed [Y/N]:

## 2020-06-22 NOTE — PROGRESS NOTE ADULT - ASSESSMENT
66 yo male with PMH of HTN, DMT2 with neuropathy, CAD s/p CABG, presents with Right foot abscess/cellulitis failing outpatient PO antibiotics.

## 2020-06-22 NOTE — H&P ADULT - NSICDXPASTMEDICALHX_GEN_ALL_CORE_FT
PAST MEDICAL HISTORY:  CAD (coronary artery disease) s/p CABGx1 2009 (LAD)    Diabetes     HLD (hyperlipidemia)     HTN (hypertension)     Marijuana smoker nightly    Obesity     Smoking current-[id number]    Spinal stenosis L5-S1

## 2020-06-22 NOTE — PROGRESS NOTE ADULT - PROBLEM SELECTOR PLAN 1
now s/p I&D by Podiatry on IV abx.  - Podiatry following, recs appreciated   - no plans for surgical intervention at this time  - c/w vanco 1g q24h and cefepime 2g q8h  - f/u wound cultures obtained by podiatry - pending  - f/u blood cultures - pending  - f/u MRI of R foot to r/o osteo  - f/u ABIs as patient anticipated to f/u with vascular study outpatient for distal arterial flow assessment

## 2020-06-22 NOTE — H&P ADULT - NSHPREVIEWOFSYSTEMS_GEN_ALL_CORE
REVIEW OF SYSTEMS:  CONSTITUTIONAL: See HPI  EYES: No eye pain or discharge  ENMT:  No sinus or throat pain  RESPIRATORY: No cough, wheezing, or shortness of breath  CARDIOVASCULAR: No chest pain, palpitations, or leg swelling  GASTROINTESTINAL: No abdominal pain. No nausea, vomiting, or hematemesis; No diarrhea or constipation.   GENITOURINARY: No dysuria or change of frequency  NEUROLOGICAL: See HPI No headaches or loss of strength  SKIN: See HPI  LYMPH NODES: No enlarged glands  MUSCULOSKELETAL: No joint pain or swelling; No muscle, back, or extremity pain  HEME/LYMPH: No easy bruising, or bleeding gums  ALLERGY AND IMMUNOLOGIC: No reactions to medications

## 2020-06-22 NOTE — H&P ADULT - PROBLEM SELECTOR PLAN 1
Podiatry recs reviewed and appreciated   Purulence from abscess was expressed, site was  flushed, packed, dressed w/ DSD Wound cultured- F/U Cultures  Currently no acute pod sx intervention at this time  Continue Vanco/Cefepime- Monitor Vanc trough  ID consult per primary team   Podiatry ordered RF MR to r/o residual abscess or OM  Will also order ABIs has patient anticipated to f/u with Vascular study outpatient for distal arterial flow assessment

## 2020-06-22 NOTE — H&P ADULT - PROBLEM SELECTOR PLAN 3
Continue with Ramiril Continue with Ramipril.   Metoprolol has not been filled since 7/2019  Patient ?furosemide last filled in 3/2020

## 2020-06-22 NOTE — PROGRESS NOTE ADULT - PROBLEM SELECTOR PLAN 5
DVT ppx: hep subc Pt has existing nicotine dependence with ~15-20 PPY of cigarettes. Patient has intermittently quit and resumed after the passing of his wife.   Smokes marijuana for chronic pain. Patient used to be on high doses of opioids in the past for chronic back pain  Risks of tobacco/nicotine usage discussed, including cardiac disease and cancer, as well as the effect on existing comorbidities by admitting hospitalist.  - Patch offered, patient declined.

## 2020-06-22 NOTE — DISCHARGE NOTE NURSING/CASE MANAGEMENT/SOCIAL WORK - PATIENT PORTAL LINK FT
You can access the FollowMyHealth Patient Portal offered by [facility] by registering at the following website: [email or web address]. By joining [employer]’s FollowMyHealth portal, you will also be able to view your health information using other applications (apps) compatible with our system.

## 2020-06-22 NOTE — PROGRESS NOTE ADULT - PROBLEM SELECTOR PLAN 2
Hold Metformin. Patient states on Tresiba 80u qHS and humalog 10u  pre-meal.   - f/u HbA1c  - increase lantus to 60u qHS  - c/w pre-meal humalog 8u qAC, hold if NPO  - c/w sliding scale

## 2020-06-22 NOTE — CONSULT NOTE ADULT - ASSESSMENT
ASSESSMENT:    RECOMMENDATIONS:    POP Walters, MD  Fellow, Infectious Diseases  Pager: [phone]  After 5pm and on Weekends: Call [phone] 65/M with PMH DM2 with neuropathy, CAD s/p CABG 2009 and stents 2018, HTN, HLD, L5-S1 spinal stenosis s/p fusion 2007.  - 6/8 [facility] ED visit with rt foot ulcer X1 day. Eval by Podiatry - s/p aseptic excisional debridement of hyperkeratotic lesion down to subq. Discharged on PO Augmentin X 10 days  - Saw Podiatrist Friday 6/19 - abx course extended  - over the weekend, developed rt foot blister, so came to ER.  - In ED, afebrile and hemodynamically stable. Eval by poditary in ER - I&D performed down to the level of subq, with 3cc purulence expressed. ID consulted for abx recs  ==========  Rt foot diabetic infection and cellulitis of RLE  - infection developed despite 10 day course of PO Augmentin  - Xray and MRI showed no s/o osteomyelitis  - CRP 1.18. CXR clear, Covid-19 PCR negative  - will cover broadly for now, pending cx results    RECOMMENDATIONS:  1. RLE cellulitis  - continue Vancomycin 1g IV q12h  - Please check Vancomycin trough before 4th sequential dose. Target trough levels 15-20  - continue Cefepime, decrease to Cefepime 1g IV q8h  - check ESR, HbA1c  - check MRSA surveillance PCR (from nares)  - f/u blood cx, wound cx  ---------  2. Intertriginous fungal infection  - recommend topical Nystatin cream q12h local application between toes  Recs conveyed to primary team NP    POP Walters, MD  Fellow, Infectious Diseases  Pager: [phone]  After 5pm and on Weekends: Call [phone] 65/M with PMH DM2 with neuropathy, CAD s/p CABG 2009 and stents 2018, HTN, HLD, L5-S1 spinal stenosis s/p fusion 2007.  - 6/8 [facility] ED visit with rt foot ulcer X1 day. Eval by Podiatry - s/p aseptic excisional debridement of hyperkeratotic lesion down to subq. Discharged on PO Augmentin X 10 days  - Saw Podiatrist Friday 6/19 - abx course extended  - over the weekend, developed rt foot blister, so came to ER.  - In ED, afebrile and hemodynamically stable. Eval by poditary in ER - I&D performed down to the level of subq, with 3cc purulence expressed. ID consulted for abx recs  ==========  Rt foot diabetic infection and cellulitis of RLE  - infection developed despite 10 day course of PO Augmentin  - Xray and MRI showed no s/o osteomyelitis  - CRP 1.18. CXR clear, Covid-19 PCR negative  - will cover broadly for now, pending cx results      Overall rt foot cellulitis, diabetic foot ulcer, leucocytosis resolved.       RECOMMENDATIONS:  1. RLE cellulitis  - continue Vancomycin 1g IV q12h  - monitor vancomycin trough and creatinine to avoid nephrotoxicity and ensure efficacy   - continue Cefepime, decrease to Cefepime 1g IV q8h  - check ESR, HbA1c  - check MRSA surveillance PCR (from nares)  - f/u blood cx, wound cx  - podiatry on board  - MRI with no OM.     ---------  2. Intertriginous fungal infection  - recommend topical Nystatin cream q12h local application between toes    Recs conveyed to primary team POP Montana, MD  Fellow, Infectious Diseases  Pager: [phone]  After 5pm and on Weekends: Call [phone]

## 2020-06-22 NOTE — H&P ADULT - NSHPLABSRESULTS_GEN_ALL_CORE
Personally reviewed labs and noted in detail below: no LOC.     Personally reviewed EKG: SR 84 bpm QTc 470 Inc RBBB, LAFB, no TW changes    Personally Reviewed Chest Xray: Clear lungs.  < from: Xray Foot AP + Lateral + Oblique, Right (06.21.20 @ 23:03) >    IMPRESSION:    No subcutaneous tracking gas collection, periosteal reaction or bony erosions.    ******PRELIMINARY REPORT******      < end of copied text >

## 2020-06-22 NOTE — PROGRESS NOTE ADULT - ASSESSMENT
66 y/o M w/ R foot sub met 3 diabetic ulcer w/ dorsal abscess s/p bedside incision and drainage     - pt seen and evaluated  - afebrile, no leukocytosis  - RFXR neg for evidence of OM or Subq gas  - R foot sub met 3 diabetic ulcer stable w/ no acute signs of infection  - R foot dorsal forefoot wound to periosteum with scant purulence, surrounding cellulitis   - flushed, packed, dressed w/ DSD  - no acute pod sx intervention   - continue IV abx   - awaits RF MRI  - discussed w/ attending

## 2020-06-22 NOTE — PROGRESS NOTE ADULT - PROBLEM SELECTOR PLAN 3
Continue with Ramipril.   Metoprolol has not been filled since 7/2019  Patient ?furosemide last filled in 3/2020 s/p CABG in 2009.  - c/w ASA and statin  - on ramipril 2.5mg daily at home - continue lisinopril inpatient  - c/w metoprolol ER 25mg PO daily

## 2020-06-22 NOTE — PROGRESS NOTE ADULT - SUBJECTIVE AND OBJECTIVE BOX
Podiatry pager #: [facility] [phone]/ [phone]    Patient is a 65y old  Male who presents with a chief complaint of right foor diabetic ulcer (22 Jun 2020 01:05)       INTERVAL HPI/OVERNIGHT EVENTS:  Patient seen and evaluated at bedside.  Pt is resting comfortable in NAD. Denies N/V/F/C.      Allergies    No Known Allergies    Intolerances        Vital Signs Last 24 Hrs  T(C): 36.9 (22 Jun 2020 05:25), Max: 37.2 (21 Jun 2020 21:34)  T(F): 98.4 (22 Jun 2020 05:25), Max: 98.9 (21 Jun 2020 21:34)  HR: 72 (22 Jun 2020 05:25) (72 - 91)  BP: 107/62 (22 Jun 2020 05:25) (106/70 - 135/80)  BP(mean): --  RR: 18 (22 Jun 2020 05:25) (18 - 18)  SpO2: 96% (22 Jun 2020 05:25) (96% - 98%)    LABS:                        12.8   8.93  )-----------( 145      ( 22 Jun 2020 06:32 )             40.2     06-22    139  |  106  |  17  ----------------------------<  236<H>  4.2   |  26  |  0.70    Ca    8.6      22 Jun 2020 06:32  Phos  2.9     06-22  Mg     2.2     06-22    TPro  6.1  /  Alb  3.7  /  TBili  0.2  /  DBili  x   /  AST  9<L>  /  ALT  6<L>  /  AlkPhos  75  06-21        CAPILLARY BLOOD GLUCOSE      POCT Blood Glucose.: 223 mg/dL (22 Jun 2020 08:16)  POCT Blood Glucose.: 275 mg/dL (22 Jun 2020 01:26)      Lower Extremity Physical Exam:  Vasc: DP/PT 1/4 b/l, TG warm to cool, R foot mildly edematous,   Neuro: Sensation diminished to level of midfoot b/l   MSK: anterior cavus b/l  Derm:    Wound #1:  Location: RF sub met 3-4  Size: 1.0 cm x 1.0 cm   Etiology: diabetic neuropathy/ chronic pressure   Depth: to subq  Wound bed: granular  Drainage: none   Odor: noen   Periwound: HPK    Wound #2:  Location: R foot dorsal 4th MPJ wound s/p incision and drainage  Size: 1.0 cm x 1.0 cm   Etiology: infection   Depth: to periosteum  Wound bed: granular/ fibrous   Drainage: scant purulence   Odor: None   Periwound: cellulitic and edematous     RADIOLOGY & ADDITIONAL TESTS:

## 2020-06-22 NOTE — CONSULT NOTE ADULT - SUBJECTIVE AND OBJECTIVE BOX
Attending:    Patient is a 65y old  Male who presents with a chief complaint of R foot infection     HPI:  65M PMH DM presents with foot infection. Pt was recently seen in 6/08 for foot infection, had bedside wound debridement by podiatry and was discharged on PO Augmentin. Pt followed up with podiatry on Friday. Over the weekend he developed a blister on the top of his R foot that popped today. Came into ER for further evaluation. Denies fevers/chills.    Review of systems negative except per HPI    PAST MEDICAL & SURGICAL HISTORY:  Marijuana smoker: nightly  Smoking: [id number]  Obesity  Spinal stenosis: L5-S1  Diabetes  HLD (hyperlipidemia)  HTN (hypertension)  CAD (coronary artery disease): s/p CABGx1 2009 (LAD)  S/P spinal fusion: 2007    Home Medications:  Aspirin Enteric Coated 81 mg oral delayed release tablet: 1 tab(s) orally once a day (09 Sep 2015 14:21)  Crestor:  orally  (09 Sep 2015 14:21)  Humalog 100 units/mL subcutaneous solution: 10 unit(s) subcutaneous once a day (09 Sep 2015 14:21)  Lantus Solostar Pen 100 units/mL subcutaneous solution: 50 unit(s) subcutaneous once a day (09 Sep 2015 14:21)  metFORMIN: 2000  orally once a day (09 Sep 2015 14:21)  metoprolol succinate 50 mg oral tablet, extended release: 1 tab(s) orally once a day (09 Sep 2015 14:21)  Onglyza 5 mg oral tablet: 1 tab(s) orally once a day (09 Sep 2015 14:21)  ramipril 2.5 mg oral capsule: 1 cap(s) orally once a day (09 Sep 2015 14:21)    Allergies    No Known Allergies    Intolerances      FAMILY HISTORY:    Social History:       LABS                        12.5   9.78  )-----------( 149      ( 21 Jun 2020 22:33 )             39.3     06-21    139  |  105  |  21  ----------------------------<  335<H>  4.5   |  24  |  0.70    Ca    9.0      21 Jun 2020 22:33    TPro  6.1  /  Alb  3.7  /  TBili  0.2  /  DBili  x   /  AST  9<L>  /  ALT  6<L>  /  AlkPhos  75  06-21        Vital Signs Last 24 Hrs  T(C): 37.2 (21 Jun 2020 21:34), Max: 37.2 (21 Jun 2020 21:34)  T(F): 98.9 (21 Jun 2020 21:34), Max: 98.9 (21 Jun 2020 21:34)  HR: 91 (21 Jun 2020 21:34) (91 - 91)  BP: 135/80 (21 Jun 2020 21:34) (135/80 - 135/80)  BP(mean): --  RR: 18 (21 Jun 2020 21:34) (18 - 18)  SpO2: 98% (21 Jun 2020 21:34) (98% - 98%)    PHYSICAL EXAM  General: NAD, AA0x3    Lower Extremity Focused:  Vasc: DP/PT 1/4 b/l, TG warm to cool, R foot mildly edematous,   Neuro: Sensation diminished to level of midfoot b/l   MSK: anterior cavus b/l  Derm:    Wound #1:  Location: RF sub met 3-4  Size: 1.0 cm x 1.0 cm   Etiology: diabetic neuropathy/ chronic pressure   Depth: to subq  Wound bed: fibrotic   Drainage: none   Odor: noen   Periwound: hyperkeratotic     Wound #2:  Location: R foot dorsal 4th MPJ blister   Size: 1.0 cm x 1.0 cm   Etiology: infection   Depth: to subq  Wound bed: erythematous   Drainage: purulent   Odor: None   Periwound: cellulitic and edematous       RADIOLOGY    < from: Xray Foot AP + Lateral + Oblique, Right (06.21.20 @ 23:03) >    ******PRELIMINARY REPORT******    ******PRELIMINARY REPORT******          EXAM:  FOOT COMPLETE RIGHT (MIN 3 VIEW)                            PROCEDURE DATE:  06/21/2020      ******PRELIMINARY REPORT******    ******PRELIMINARY REPORT******              INTERPRETATION:  CLINICAL INDICATION: Infection at base middle toes. Right foot pain. Evaluate for osteoarthritis.    TECHNIQUE: 3 views right foot.    COMPARISON: Right foot x-ray 6/8/2020.    FINDINGS:    No subcutaneous tracking gas collection,periosteal reaction or bony erosions. No acute fracture or dislocation. Joint spaces are preserved. Retrocalcaneal enthesophyte formation. Soft tissue swelling. Vascular calcification is present.    IMPRESSION:    No subcutaneous tracking gas collection, periosteal reaction or bony erosions.              ******PRELIMINARY REPORT******    ******PRELIMINARY REPORT******          MARKO DAVIDSON M.D., RADIOLOGY RESIDENT    < end of copied text >

## 2020-06-22 NOTE — H&P ADULT - ASSESSMENT
64 yo man with PMH of HTN, DMT2 with neuropathy, CAD s/p CABG, presents with Right foot abscess/cellulitis

## 2020-06-22 NOTE — PROGRESS NOTE ADULT - PROBLEM SELECTOR PLAN 4
Pt has existing nicotine dependence with ~15-20 PPY of cigarettes. Patient has intermittently quit and resumed after the passing of his wife.   Smokes marijuana for chronic pain. Patient used to be on high doses of opioids in the past for chronic back pain  ~5 minutes spent at bedside counseling on nicotine cessation.   Risks of tobacco/nicotine usage discussed, including cardiac disease and cancer, as well as the effect on existing comorbidities by admitting hospitalist.  - Patch offered, patient declined. BP within acceptable range.  - c/w lisinopril and metoprolol as above

## 2020-06-22 NOTE — H&P ADULT - ATTENDING COMMENTS
Patient was previously unknown to me. Patient was assigned to me by hospitalist in charge. My involvement in this case consisted only of the initial history, physical and management plan. Primary medicine day team to assume care in AM and thereafter. Case discussed in detail with overnight medicine NP/PA Patient was previously unknown to me. Patient was assigned to me by hospitalist in charge. My involvement in this case consisted only of the initial history, physical and management plan. Primary medicine day team to assume care in AM and thereafter. Case discussed in detail with overnight medicine NP/PA Leenan [phone]

## 2020-06-22 NOTE — H&P ADULT - PROBLEM SELECTOR PLAN 4
Pt has existing nicotine dependence with ~15-20 PPY of cigarettes. Patient has intermittently quit and resumed after the passing of his wife.   Smokes marijuana for chronic pain. Patient used to be on high doses of opioids in the past for chronic back pain  ~5 minutes spent at bedside counseling on nicotine cessation.   Risks of tobacco/nicotine usage discussed, including cardiac disease and cancer, as well as the effect on existing comorbidities.   Patch offerred, patient declined.

## 2020-06-22 NOTE — H&P ADULT - NSHPSOCIALHISTORY_GEN_ALL_CORE
+Tobacco use. ~1 ppd  +Daily marijuana use in the evening  Denies ETOH use  Daughter involved in care

## 2020-06-22 NOTE — CONSULT NOTE ADULT - ASSESSMENT
64 y/o M w/ R foot sub met 3 diabetic ulcer w/ dorsal abscess    - pt seen and evaluated  - afebrile, no leukocytosis  - RFXR prelim neg for evidence of OM or subq gas  - R foot sub met 3 diabetic ulcer stable w/ no acute signs of infection  - R foot dorsal forefoot blister w/ purulent drainage and cellulitic periwound  - local block performed to R foot utilizing 4 cc 1% lidocaine plain  - I&D performed down to the level of sub q over apex of blister utilizing sterile #15 blade  - 3cc purulence expressed, no malodor, undermines medially   - flushed, packed, dressed w/ DSD  - wound cultured  - no acute pod sx intervention   - admit to medicine for IV abx since failed outpt PO   - Vanco/Cefepime  - rec ID c/s   - ordered RF MR to r/o residual abscess or OM  - final pod plan pending MR  - discussed w/ attending

## 2020-06-22 NOTE — PHARMACOTHERAPY INTERVENTION NOTE - COMMENTS
Rounded with ID team - recommended decreasing cefepime dose to 1g q8h for soft tissue infection.  ID team wanted to continue current vancomycin dose.

## 2020-06-22 NOTE — H&P ADULT - NSHPPHYSICALEXAM_GEN_ALL_CORE
Vital Signs Last 24 Hrs  T(C): 36.7 (22 Jun 2020 00:27), Max: 37.2 (21 Jun 2020 21:34)  T(F): 98.1 (22 Jun 2020 00:27), Max: 98.9 (21 Jun 2020 21:34)  HR: 79 (22 Jun 2020 00:27) (79 - 91)  BP: 106/70 (22 Jun 2020 00:36) (106/70 - 135/80)  BP(mean): --  RR: 18 (22 Jun 2020 00:27) (18 - 18)  SpO2: 98% (22 Jun 2020 00:27) (98% - 98%)      PHYSICAL EXAM:  GENERAL: NAD, well-groomed, well-developed  HEAD:  Atraumatic, Normocephalic  EYES: EOMI, PERRLA, conjunctiva and sclera clear  ENMT: No tonsillar erythema, exudates, or enlargement; Moist mucous membranes, Good dentition, No lesions  NECK: Supple, No JVD, Normal thyroid  NERVOUS SYSTEM:  Alert & Oriented X3, Good concentration; Motor Strength 5/5 B/L upper and lower extremities  CHEST/LUNG: Clear to percussion bilaterally; No rales, rhonchi, or wheezing  HEART: Regular rate and rhythm +S1 S2; No murmurs, rubs, or gallops  ABDOMEN: Soft, Nontender, Nondistended; Bowel sounds present  EXTREMITIES:  2+ radial pulses No clubbing, cyanosis, or edema  LYMPH: No lymphadenopathy noted  SKIN: No rashes or lesions. Right foot bandaged by podiatry after assessment: Dressing is intact. Refer to podiatry exam.

## 2020-06-22 NOTE — H&P ADULT - PROBLEM SELECTOR PLAN 6
Primary day team to complete med rec Primary day team to complete med rec: Patient  can not recall all names of HTN meds. Patient has switched pharmacies. Call PMD in AM to reconcile

## 2020-06-22 NOTE — H&P ADULT - HISTORY OF PRESENT ILLNESS
66 yo man with PMH of HTN, DMT2 with neuropathy, CAD s/p CABG, presents from home in setting of right dorsal foot infection. Patient states that he developed a blister on the top of the foot and had erythema develop on the top of the foot. Patient was evaluated on 6/8/2020 for diabetic foot infection under the right foot that was debrided by podiatry in ED and sent home on Augmentin. Patient has been on Augmentin until yesterday (his outpatient podiatrist extended period of antibiotics and patient finished course). Patient states that he saw his Podiatrist this past Friday and states that the dorsal aspect of the foot where the ulcer was has improved. Blister on top of the right foot developed after his visit. Denies fevers and sweats. Does endorse having chills but has been happening intermittently in the past few weeks/months. Denies pain to the foot in setting of chronic neuropathy.

## 2020-06-23 ENCOUNTER — TRANSCRIPTION ENCOUNTER (OUTPATIENT)
Age: 65
End: 2020-06-23

## 2020-06-23 DIAGNOSIS — Z02.9 ENCOUNTER FOR ADMINISTRATIVE EXAMINATIONS, UNSPECIFIED: ICD-10-CM

## 2020-06-23 LAB
A1C WITH ESTIMATED AVERAGE GLUCOSE RESULT: 7.4 % — HIGH (ref 4–5.6)
BUN SERPL-MCNC: 14 MG/DL — SIGNIFICANT CHANGE UP (ref 7–23)
CALCIUM SERPL-MCNC: 8.7 MG/DL — SIGNIFICANT CHANGE UP (ref 8.4–10.5)
CHLORIDE SERPL-SCNC: 106 MMOL/L — SIGNIFICANT CHANGE UP (ref 96–108)
CO2 SERPL-SCNC: 25 MMOL/L — SIGNIFICANT CHANGE UP (ref 22–31)
CREAT SERPL-MCNC: 0.66 MG/DL — SIGNIFICANT CHANGE UP (ref 0.5–1.3)
ESTIMATED AVERAGE GLUCOSE: 166 MG/DL — HIGH (ref 68–114)
GLUCOSE BLDC GLUCOMTR-MCNC: 113 MG/DL — HIGH (ref 70–99)
GLUCOSE BLDC GLUCOMTR-MCNC: 142 MG/DL — HIGH (ref 70–99)
GLUCOSE BLDC GLUCOMTR-MCNC: 172 MG/DL — HIGH (ref 70–99)
GLUCOSE BLDC GLUCOMTR-MCNC: 213 MG/DL — HIGH (ref 70–99)
GLUCOSE SERPL-MCNC: 146 MG/DL — HIGH (ref 70–99)
HCT VFR BLD CALC: 38.3 % — LOW (ref 39–50)
HCV AB S/CO SERPL IA: 0.13 S/CO — SIGNIFICANT CHANGE UP (ref 0–0.99)
HCV AB SERPL-IMP: SIGNIFICANT CHANGE UP
HGB BLD-MCNC: 12.4 G/DL — LOW (ref 13–17)
MCHC RBC-ENTMCNC: 29.2 PG — SIGNIFICANT CHANGE UP (ref 27–34)
MCHC RBC-ENTMCNC: 32.4 GM/DL — SIGNIFICANT CHANGE UP (ref 32–36)
MCV RBC AUTO: 90.1 FL — SIGNIFICANT CHANGE UP (ref 80–100)
NRBC # BLD: 0 /100 WBCS — SIGNIFICANT CHANGE UP (ref 0–0)
PLATELET # BLD AUTO: 133 K/UL — LOW (ref 150–400)
POTASSIUM SERPL-MCNC: 3.9 MMOL/L — SIGNIFICANT CHANGE UP (ref 3.5–5.3)
POTASSIUM SERPL-SCNC: 3.9 MMOL/L — SIGNIFICANT CHANGE UP (ref 3.5–5.3)
RBC # BLD: 4.25 M/UL — SIGNIFICANT CHANGE UP (ref 4.2–5.8)
RBC # FLD: 14.1 % — SIGNIFICANT CHANGE UP (ref 10.3–14.5)
SODIUM SERPL-SCNC: 138 MMOL/L — SIGNIFICANT CHANGE UP (ref 135–145)
WBC # BLD: 7.52 K/UL — SIGNIFICANT CHANGE UP (ref 3.8–10.5)
WBC # FLD AUTO: 7.52 K/UL — SIGNIFICANT CHANGE UP (ref 3.8–10.5)

## 2020-06-23 PROCEDURE — 99232 SBSQ HOSP IP/OBS MODERATE 35: CPT

## 2020-06-23 PROCEDURE — 99233 SBSQ HOSP IP/OBS HIGH 50: CPT

## 2020-06-23 RX ORDER — LANOLIN ALCOHOL/MO/W.PET/CERES
5 CREAM (GRAM) TOPICAL ONCE
Refills: 0 | Status: COMPLETED | OUTPATIENT
Start: 2020-06-23 | End: 2020-06-23

## 2020-06-23 RX ORDER — ACETAMINOPHEN 500 MG
1000 TABLET ORAL ONCE
Refills: 0 | Status: COMPLETED | OUTPATIENT
Start: 2020-06-23 | End: 2020-06-23

## 2020-06-23 RX ORDER — ROSUVASTATIN CALCIUM 5 MG/1
1 TABLET ORAL
Qty: 0 | Refills: 0 | DISCHARGE

## 2020-06-23 RX ORDER — METOPROLOL TARTRATE 50 MG
1 TABLET ORAL
Qty: 0 | Refills: 0 | DISCHARGE

## 2020-06-23 RX ORDER — SEMAGLUTIDE 0.68 MG/ML
0 INJECTION, SOLUTION SUBCUTANEOUS
Qty: 0 | Refills: 0 | DISCHARGE

## 2020-06-23 RX ADMIN — ATORVASTATIN CALCIUM 80 MILLIGRAM(S): 80 TABLET, FILM COATED ORAL at 21:07

## 2020-06-23 RX ADMIN — CEFEPIME 100 MILLIGRAM(S): 1 INJECTION, POWDER, FOR SOLUTION INTRAMUSCULAR; INTRAVENOUS at 06:08

## 2020-06-23 RX ADMIN — INSULIN GLARGINE 60 UNIT(S): 100 INJECTION, SOLUTION SUBCUTANEOUS at 22:00

## 2020-06-23 RX ADMIN — Medication 25 MILLIGRAM(S): at 06:08

## 2020-06-23 RX ADMIN — Medication 150 MILLIGRAM(S): at 06:08

## 2020-06-23 RX ADMIN — TAMSULOSIN HYDROCHLORIDE 0.4 MILLIGRAM(S): 0.4 CAPSULE ORAL at 21:07

## 2020-06-23 RX ADMIN — HEPARIN SODIUM 5000 UNIT(S): 5000 INJECTION INTRAVENOUS; SUBCUTANEOUS at 06:08

## 2020-06-23 RX ADMIN — Medication 5 MILLIGRAM(S): at 22:00

## 2020-06-23 RX ADMIN — HEPARIN SODIUM 5000 UNIT(S): 5000 INJECTION INTRAVENOUS; SUBCUTANEOUS at 21:07

## 2020-06-23 RX ADMIN — Medication 150 MILLIGRAM(S): at 21:07

## 2020-06-23 RX ADMIN — Medication 400 MILLIGRAM(S): at 00:38

## 2020-06-23 RX ADMIN — NYSTATIN CREAM 1 APPLICATION(S): 100000 CREAM TOPICAL at 06:08

## 2020-06-23 RX ADMIN — LISINOPRIL 10 MILLIGRAM(S): 2.5 TABLET ORAL at 06:08

## 2020-06-23 RX ADMIN — CEFEPIME 100 MILLIGRAM(S): 1 INJECTION, POWDER, FOR SOLUTION INTRAMUSCULAR; INTRAVENOUS at 21:08

## 2020-06-23 RX ADMIN — Medication 650 MILLIGRAM(S): at 21:08

## 2020-06-23 RX ADMIN — HEPARIN SODIUM 5000 UNIT(S): 5000 INJECTION INTRAVENOUS; SUBCUTANEOUS at 13:07

## 2020-06-23 RX ADMIN — NYSTATIN CREAM 1 APPLICATION(S): 100000 CREAM TOPICAL at 18:05

## 2020-06-23 RX ADMIN — Medication 150 MILLIGRAM(S): at 13:14

## 2020-06-23 RX ADMIN — Medication 250 MILLIGRAM(S): at 00:22

## 2020-06-23 RX ADMIN — Medication 8 UNIT(S): at 12:33

## 2020-06-23 RX ADMIN — CEFEPIME 100 MILLIGRAM(S): 1 INJECTION, POWDER, FOR SOLUTION INTRAMUSCULAR; INTRAVENOUS at 13:07

## 2020-06-23 RX ADMIN — Medication 1: at 12:33

## 2020-06-23 RX ADMIN — Medication 81 MILLIGRAM(S): at 13:06

## 2020-06-23 NOTE — PROGRESS NOTE ADULT - PROBLEM SELECTOR PLAN 5
Pt has existing nicotine dependence with ~15-20 PPY of cigarettes. Patient has intermittently quit and resumed after the passing of his wife.   Smokes marijuana for chronic pain. Patient used to be on high doses of opioids in the past for chronic back pain  Risks of tobacco/nicotine usage discussed, including cardiac disease and cancer, as well as the effect on existing comorbidities by admitting hospitalist.  - Patch offered, patient declined.

## 2020-06-23 NOTE — PROGRESS NOTE ADULT - PROBLEM/PLAN-1
Received report from [facility] for advanced heart block with patient reported symptoms of lightheaded, dizzy. Pt contacted to discuss. No answer, LVM advising patient to discontinue Coreg and begin Lisinopril 20 mg daily. Requested pt to return call to discuss further.    DISPLAY PLAN FREE TEXT

## 2020-06-23 NOTE — PROGRESS NOTE ADULT - SUBJECTIVE AND OBJECTIVE BOX
[facility] Division of Hospital Medicine  Caren Aaron MD  Pager (M-F, 1P-1N): [phone]  Other Times:  [phone]      Patient is a 65y old  Male who presents with a chief complaint of right foor diabetic ulcer (23 Jun 2020 12:00)      SUBJECTIVE / OVERNIGHT EVENTS: no acute events overnight. no fever nor chills. overall feels well. still with some drainage from R foot, but overall improved. packed by podiatry this AM.     ADDITIONAL REVIEW OF SYSTEMS:    MEDICATIONS  (STANDING):  aspirin enteric coated 81 milliGRAM(s) Oral daily  atorvastatin 80 milliGRAM(s) Oral at bedtime  cefepime   IVPB 1000 milliGRAM(s) IV Intermittent every 8 hours  dextrose 5%. 1000 milliLiter(s) (50 mL/Hr) IV Continuous <Continuous>  dextrose 50% Injectable 12.5 Gram(s) IV Push once  dextrose 50% Injectable 25 Gram(s) IV Push once  dextrose 50% Injectable 25 Gram(s) IV Push once  heparin   Injectable 5000 Unit(s) SubCutaneous every 8 hours  insulin glargine Injectable (LANTUS) 60 Unit(s) SubCutaneous at bedtime  insulin lispro (HumaLOG) corrective regimen sliding scale   SubCutaneous three times a day before meals  insulin lispro (HumaLOG) corrective regimen sliding scale   SubCutaneous at bedtime  insulin lispro Injectable (HumaLOG) 8 Unit(s) SubCutaneous three times a day before meals  metoprolol succinate ER 25 milliGRAM(s) Oral daily  nystatin Cream 1 Application(s) Topical every 12 hours  pregabalin 150 milliGRAM(s) Oral three times a day  tamsulosin 0.4 milliGRAM(s) Oral at bedtime    MEDICATIONS  (PRN):  acetaminophen   Tablet .. 650 milliGRAM(s) Oral every 6 hours PRN Temp greater or equal to 38C (100.4F), Mild Pain (1 - 3)  dextrose 40% Gel 15 Gram(s) Oral once PRN Blood Glucose LESS THAN 70 milliGRAM(s)/deciliter  glucagon  Injectable 1 milliGRAM(s) IntraMuscular once PRN Glucose LESS THAN 70 milligrams/deciliter      CAPILLARY BLOOD GLUCOSE      POCT Blood Glucose.: 172 mg/dL (23 Jun 2020 12:09)  POCT Blood Glucose.: 142 mg/dL (23 Jun 2020 08:46)  POCT Blood Glucose.: 174 mg/dL (22 Jun 2020 20:57)  POCT Blood Glucose.: 173 mg/dL (22 Jun 2020 16:57)    I&O's Summary    22 Jun 2020 07:01  -  23 Jun 2020 07:00  --------------------------------------------------------  IN: 1670 mL / OUT: 650 mL / NET: 1020 mL        PHYSICAL EXAM:  Vital Signs Last 24 Hrs  T(C): 36.6 (23 Jun 2020 13:04), Max: 36.9 (22 Jun 2020 13:43)  T(F): 97.8 (23 Jun 2020 13:04), Max: 98.4 (22 Jun 2020 13:43)  HR: 68 (23 Jun 2020 13:04) (64 - 83)  BP: 105/69 (23 Jun 2020 13:04) (75/52 - 126/74)  BP(mean): --  RR: 18 (23 Jun 2020 13:04) (18 - 18)  SpO2: 96% (23 Jun 2020 13:04) (96% - 98%)    CONSTITUTIONAL: NAD, well-developed, well-groomed  EYES: PERRLA; conjunctiva and sclera clear  ENMT: Moist oral mucosa  Supple, no palpable masses; no thyromegaly  RESPIRATORY: Normal respiratory effort; lungs are clear to auscultation bilaterally  CARDIOVASCULAR: Regular rate and rhythm, normal S1 and S2, no murmur/rub/gallop; No lower extremity edema  ABDOMEN: Soft, Nondistended,  Nontender to palpation, normoactive bowel sounds  MUSCULOSKELETAL:  No clubbing or cyanosis of digits; no joint swelling or tenderness to palpation  PSYCH: A+O to person, place, and time; affect appropriate  NEUROLOGY: CN 2-12 are intact and symmetric; no gross sensory deficits   SKIN: +right foot wound with dorsal wound with packing, minimal drainage erythema, plantar aspect with healed diabetic ulcer    LABS:                        12.4   7.52  )-----------( 133      ( 23 Jun 2020 07:29 )             38.3     06-23    138  |  106  |  14  ----------------------------<  146<H>  3.9   |  25  |  0.66    Ca    8.7      23 Jun 2020 06:46  Phos  2.9     06-22  Mg     2.2     06-22    TPro  6.1  /  Alb  3.7  /  TBili  0.2  /  DBili  x   /  AST  9<L>  /  ALT  6<L>  /  AlkPhos  75  06-21      Culture - Abscess with Gram Stain (collected 22 Jun 2020 05:33)  Source: .Abscess right foot  Preliminary Report (23 Jun 2020 08:45):    No growth    Culture - Blood (collected 22 Jun 2020 04:30)  Source: .Blood Blood-Peripheral  Preliminary Report (23 Jun 2020 05:01):    No growth to date.    Culture - Blood (collected 22 Jun 2020 04:30)  Source: .Blood Blood-Venous  Preliminary Report (23 Jun 2020 05:01):    No growth to date.    MRSA PCR negative    RADIOLOGY & ADDITIONAL TESTS:  Results Reviewed: no leukocytosis, H/H stable, BUN/Cr stable, FS improved, wound cx NGTD  Imaging Personally Reviewed: MRI right foot with no evidence of osteomyelitis  JUDIT:  Impression: The quality of this examination is adversely impacted by the noncompressible nature of the diabetic arteries.    No definite lower extremity arterial disease is identified.  Electrocardiogram Personally Reviewed:    COORDINATION OF CARE:  Care Discussed with Consultants/Other Providers [Y]: medicine NP [place]  Prior or Outpatient Records Reviewed [Y]: podiatry and infectious disease progress notes

## 2020-06-23 NOTE — PROGRESS NOTE ADULT - SUBJECTIVE AND OBJECTIVE BOX
65y old  Male who presents with a chief complaint of right foot diabetic ulcer (23 Jun 2020 10:09)      Interval history:  Afebrile, leg swelling improved.       Allergies:   No Known Allergies      Antimicrobials:  cefepime   IVPB 1000 milliGRAM(s) IV Intermittent every 8 hours      REVIEW OF SYSTEMS:  No chest pain  No SOB  No N/V  No dysuria   No rash.       Vital Signs Last 24 Hrs  T(C): 36.4 (06-23-20 @ 05:21), Max: 36.9 (06-22-20 @ 13:43)  T(F): 97.6 (06-23-20 @ 05:21), Max: 98.4 (06-22-20 @ 13:43)  HR: 71 (06-23-20 @ 10:08) (64 - 83)  BP: 97/54 (06-23-20 @ 10:08) (75/52 - 126/74)  BP(mean): --  RR: 18 (06-23-20 @ 05:21) (18 - 18)  SpO2: 98% (06-23-20 @ 05:21) (98% - 98%)      PHYSICAL EXAM:  Patient in no acute distress. AAOX3.  No icterus, no oral ulcers.  Cardiovascular: S1S2 normal.  Lungs: + air entry B/L lung fields.  Gastrointestinal: soft, nontender, nondistended.  Extremities: Improved edema, rt foot I&D of abscess, decreased warmth and erythema.   IV sites not inflamed.                             12.4   7.52  )-----------( 133      ( 23 Jun 2020 07:29 )             38.3   06-23    138  |  106  |  14  ----------------------------<  146<H>  3.9   |  25  |  0.66    Ca    8.7      23 Jun 2020 06:46  Phos  2.9     06-22  Mg     2.2     06-22    TPro  6.1  /  Alb  3.7  /  TBili  0.2  /  DBili  x   /  AST  9<L>  /  ALT  6<L>  /  AlkPhos  75  06-21      LIVER FUNCTIONS - ( 21 Jun 2020 22:33 )  Alb: 3.7 g/dL / Pro: 6.1 g/dL / ALK PHOS: 75 U/L / ALT: 6 U/L / AST: 9 U/L / GGT: x               Culture - Abscess with Gram Stain (collected 22 Jun 2020 05:33)  Source: .Abscess right foot  Preliminary Report (23 Jun 2020 08:45):    No growth    Culture - Blood (collected 22 Jun 2020 04:30)  Source: .Blood Blood-Peripheral  Preliminary Report (23 Jun 2020 05:01):    No growth to date.    Culture - Blood (collected 22 Jun 2020 04:30)  Source: .Blood Blood-Venous  Preliminary Report (23 Jun 2020 05:01):    No growth to date.      Radiology:  < from: MR Foot w/wo IV Cont, Right (06.22.20 @ 11:43) >  IMPRESSION:    1.  Skin defect which may reflect ulcer or recent I&D surgical site. No drainable fluid collection.  2.  No abnormal marrow signal to suggest osteomyelitis.

## 2020-06-23 NOTE — DISCHARGE NOTE PROVIDER - NSDCCPCAREPLAN_GEN_ALL_CORE_FT
PRINCIPAL DISCHARGE DIAGNOSIS  Diagnosis: Cellulitis of right foot  Assessment and Plan of Treatment: Podiatry Discharge Instructions:  Follow up: Please follow up with Dr. Srinivasan within 1 week of discharge from the hospital, please call [place] office: [phone]  [place] office: [phone] for appointment and discuss that you recently were seen in the hospital.  Wound Care: Please pack with 1/4 inch iodoform packing to the Right foot dorsal wound then redress with 4x4 gauze and paul after saline irrigation daily by home nurse.   Weight bearing: Please weight bear as tolerated in a surgical shoe.  Antibiotics: Please continue as instructed.      SECONDARY DISCHARGE DIAGNOSES  Diagnosis: Type 2 diabetes mellitus with other specified complication, unspecified whether long term insulin use  Assessment and Plan of Treatment: Make sure you get your HgA1c checked every three months.  If you take oral diabetes medications, check your blood glucose two times a day.  If you take insulin, check your blood glucose before meals and at bedtime.  It's important not to skip any meals.  Keep a log of your blood glucose results and always take it with you to your doctor appointments.  Keep a list of your current medications including injectables and over the counter medications and bring this medication list with you to all your doctor appointments.  If you have not seen your ophthalmologist this year call for appointment.  Check your feet daily for redness, sores, or openings. Do not self treat. If no improvement in two days call your primary care physician for an appointment.  Low blood sugar (hypoglycemia) is a blood sugar below 70mg/dl. Check your blood sugar if you feel signs/symptoms of hypoglycemia. If your blood sugar is below 70 take 15 grams of carbohydrates (ex 4 oz of apple juice, 3-4 glucose tablets, or 4-6 oz of regular soda) wait 15 minutes and repeat blood sugar to make sure it comes up above 70.  If your blood sugar is above 70 and you are due for a meal, have a meal.  If you are not due for a meal have a snack.  This snack helps keeps your blood sugar at a safe range. PRINCIPAL DISCHARGE DIAGNOSIS  Diagnosis: Cellulitis of right foot  Assessment and Plan of Treatment: Podiatry Discharge Instructions:  Follow up: Please follow up with Dr. Srinivasan within 1 week of discharge from the hospital, please call [place] office: [phone]  [place] office: [phone] for appointment and discuss that you recently were seen in the hospital.  Wound Care: Please pack with 1/4 inch iodoform packing to the Right foot dorsal wound then redress with 4x4 gauze and paul after saline irrigation daily by home nurse.   Weight bearing: Please weight bear as tolerated in a surgical shoe.  Antibiotics: Please continue as instructed.      SECONDARY DISCHARGE DIAGNOSES  Diagnosis: HTN (hypertension)  Assessment and Plan of Treatment: Ramipril held in the hospital. Follow up with PMD for blood pressure check.   Take medications for your blood pressure as recommended.  Eat a heart healthy diet that is low in saturated fats and salt, and includes whole grains, fruits, vegetables and lean protein   Exercise regularly (consult with your physician or cardiologist first); maintain a heart healthy weight.   If you smoke - quit (A resource to help you stop smoking is the [facility] – phone number 018-428-2476.). Continue to follow with your primary physician or cardiologist.   Seek medical help for dizziness, Lightheadedness, Blurry vision, Headache, Chest pain, Shortness of breath  Follow up with your medical doctor to establish long term blood pressure treatment goals.    Diagnosis: Type 2 diabetes mellitus with other specified complication, unspecified whether long term insulin use  Assessment and Plan of Treatment: Make sure you get your HgA1c checked every three months.  If you take oral diabetes medications, check your blood glucose two times a day.  If you take insulin, check your blood glucose before meals and at bedtime.  It's important not to skip any meals.  Keep a log of your blood glucose results and always take it with you to your doctor appointments.  Keep a list of your current medications including injectables and over the counter medications and bring this medication list with you to all your doctor appointments.  If you have not seen your ophthalmologist this year call for appointment.  Check your feet daily for redness, sores, or openings. Do not self treat. If no improvement in two days call your primary care physician for an appointment.  Low blood sugar (hypoglycemia) is a blood sugar below 70mg/dl. Check your blood sugar if you feel signs/symptoms of hypoglycemia. If your blood sugar is below 70 take 15 grams of carbohydrates (ex 4 oz of apple juice, 3-4 glucose tablets, or 4-6 oz of regular soda) wait 15 minutes and repeat blood sugar to make sure it comes up above 70.  If your blood sugar is above 70 and you are due for a meal, have a meal.  If you are not due for a meal have a snack.  This snack helps keeps your blood sugar at a safe range.

## 2020-06-23 NOTE — PROGRESS NOTE ADULT - ASSESSMENT
65/M with PMH DM2 with neuropathy, CAD s/p CABG 2009 and stents 2018, HTN, HLD, L5-S1 spinal stenosis s/p fusion 2007.  - 6/8 [facility] ED visit with rt foot ulcer X1 day. Eval by Podiatry - s/p aseptic excisional debridement of hyperkeratotic lesion down to subq. Discharged on PO Augmentin X 10 days  - Saw Podiatrist Friday 6/19 - abx course extended  - over the weekend, developed rt foot blister, so came to ER.  - In ED, afebrile and hemodynamically stable. Eval by poditary in ER - I&D performed down to the level of subq, with 3cc purulence expressed. ID consulted for abx recs  ==========  Rt foot diabetic infection and cellulitis of RLE  - infection developed despite 10 day course of PO Augmentin  - Xray and MRI showed no s/o osteomyelitis  - CRP 1.18. CXR clear, Covid-19 PCR negative  - will cover broadly for now, pending cx results      Overall rt foot cellulitis, diabetic foot ulcer, leucocytosis resolved.       RECOMMENDATIONS:  1. RLE cellulitis  - stopped vanco, MRSA PCR negative.   - continue Cefepime, decrease to Cefepime 1g IV q8h  - check ESR, HbA1c  - f/u blood cx, wound cx  - podiatry on board  - MRI with no OM.       2. Intertriginous fungal infection  - recommend topical Nystatin cream q12h local application between toes

## 2020-06-23 NOTE — PHYSICAL THERAPY INITIAL EVALUATION ADULT - PERTINENT HX OF CURRENT PROBLEM, REHAB EVAL
65M PMH DM presents with foot infection. Pt was recently seen in 6/08 for foot infection, had bedside wound debridement by podiatry and was discharged on PO Augmentin. Pt followed up with podiatry on Friday. Over the weekend he developed a blister on the top of his R foot that popped today. Came into ER for further evaluation. R foot xray: No subcutaneous tracking gas collection, periosteal reaction or bony erosions.

## 2020-06-23 NOTE — PHYSICAL THERAPY INITIAL EVALUATION ADULT - DISCHARGE DISPOSITION, PT EVAL
rehabilitation facility/Subacute Rehab - pt wants to go home. Recommend home PT and rolling walker for home. Assist at home with functional tasks

## 2020-06-23 NOTE — PROGRESS NOTE ADULT - PROBLEM SELECTOR PLAN 1
now s/p I&D by Podiatry on IV abx.  - Podiatry following, recs appreciated   - no plans for surgical intervention at this time  - MRSA PCR negative, vancomycin d/c'ed  - c/w [id number] q8h as per ID  - f/u wound cultures obtained by podiatry with NGTD  - f/u blood cultures NGTD  - MRI of R foot with no evidence of osteomyelitis  - ABIs with no definite lower extremity arterial disease identified, but now s/p I&D by Podiatry on IV abx.  - Podiatry following, recs appreciated   - no plans for surgical intervention at this time  - MRSA PCR negative, vancomycin d/c'ed  - c/w [id number] q8h as per ID  - f/u wound cultures obtained by podiatry with NGTD  - f/u blood cultures NGTD  - MRI of R foot with no evidence of osteomyelitis  - ABIs with no definite lower extremity arterial disease identified, but suboptimal study due to noncompressible vessels

## 2020-06-23 NOTE — PROGRESS NOTE ADULT - ASSESSMENT
60 yo male with right foot diabetic ulcers    - pt seen and evaluated  - afebrile, no leukocytosis  - RFXR neg for evidence of OM or Subq gas  - R foot sub met 3 diabetic ulcer stable w/ no acute signs of infection  - R foot dorsal forefoot wound to periosteum with no purulence, surrounding cellulitis improved since yesterday  - Flushed, packed, dressed w/ DSD  - No acute pod sx intervention   - pt stable to be d/c per podiatry on PO Abx pending culture results and final ID recs  - MRI 6/22/2020 No abnormal marrow signal to suggest osteomyelitis.  - Discussed w/ attending

## 2020-06-23 NOTE — PROVIDER CONTACT NOTE (CRITICAL VALUE NOTIFICATION) - BACKGROUND
Pt is a 47 yr old admitted for severe sepsis secondary to scrotal cellulitis. Pt on a heparin drip at 24ml/hr

## 2020-06-23 NOTE — DISCHARGE NOTE PROVIDER - NSDCMRMEDTOKEN_GEN_ALL_CORE_FT
Aspirin Enteric Coated 81 mg oral delayed release tablet: 1 tab(s) orally once a day  Crestor 40 mg oral tablet: 1 tab(s) orally once a day  Humalog 100 units/mL subcutaneous solution: 10 unit(s) subcutaneous once a day  metFORMIN 1000 mg oral tablet: 1 tab(s) orally 2 times a day  metoprolol succinate 50 mg oral tablet, extended release: 1 tab(s) orally once a day  OZEMPIC      INJ 2/1.5ML: subcutaneous once a week  PREGABALIN   CAP 150M tab(s) orally 3 times a day  ramipril 2.5 mg oral capsule: 1 cap(s) orally once a day  TAMSULOSIN   CAP 0.4M  orally once a day  TRESIBA FLEX INJ 200UNIT: 80 unit(s) subcutaneous once a day (at bedtime) Aspirin Enteric Coated 81 mg oral delayed release tablet: 1 tab(s) orally once a day  Crestor 40 mg oral tablet: 1 tab(s) orally once a day  Humalog 100 units/mL subcutaneous solution: 10 unit(s) subcutaneous once a day  metFORMIN 1000 mg oral tablet: 1 tab(s) orally 2 times a day  Metoprolol Succinate ER 25 mg oral tablet, extended release: 1 tab(s) orally once a day  OZEMPIC      INJ 2/1.5ML: subcutaneous once a week  PREGABALIN   CAP 150M tab(s) orally 3 times a day  ramipril 2.5 mg oral capsule: 1 cap(s) orally once a day  TAMSULOSIN   CAP 0.4M  orally once a day  TRESIBA FLEX INJ 200UNIT: 80 unit(s) subcutaneous once a day (at bedtime) Aspirin Enteric Coated 81 mg oral delayed release tablet: 1 tab(s) orally once a day  Bactrim  mg-160 mg oral tablet: 1 tab(s) orally every 12 hours   Cipro 500 mg oral tablet: 1 tab(s) orally every 12 hours   Crestor 40 mg oral tablet: 1 tab(s) orally once a day  Humalog 100 units/mL subcutaneous solution: 8 unit(s) subcutaneous once a day  Metoprolol Succinate ER 25 mg oral tablet, extended release: 1 tab(s) orally once a day  nystatin 100,000 units/g topical cream: 1 application topically every 12 hours  OZEMPIC      INJ 2/1.5ML: subcutaneous once a week  PREGABALIN   CAP 150M tab(s) orally 3 times a day  tamsulosin 0.4 mg oral capsule: 1 cap(s) orally once a day (at bedtime)  TRESIBA FLEX INJ 200UNIT: 60 unit(s) subcutaneous once a day (at bedtime)

## 2020-06-23 NOTE — DISCHARGE NOTE PROVIDER - CARE PROVIDERS DIRECT ADDRESSES
[email or web address],DirectAddress_Unknown

## 2020-06-23 NOTE — DISCHARGE NOTE PROVIDER - CARE PROVIDER_API CALL
Hunter Dinh  INFECTIOUS DISEASE  [address] [place] [address]   [place] [address]  Phone: [phone]  Fax: [phone]  Follow Up Time: Hunter Dinh  INFECTIOUS DISEASE  [address] [place] [address] [place] [address]  Phone: [phone]  Fax: [phone]  Follow Up Time:     Leobardo Buckner  [address] [address]  Phone: [phone]  Fax: [phone]  Follow Up Time:

## 2020-06-23 NOTE — PROGRESS NOTE ADULT - PROBLEM SELECTOR PLAN 2
Hold Metformin. Patient states on Tresiba 80u qHS and humalog 10u  pre-meal.   - HbA1c 7.4%  - c/w lantus to 60u qHS  - c/w pre-meal humalog 8u qAC, hold if NPO  - c/w sliding scale Hold Metformin. Patient states on Tresiba 80u qHS and humalog 10u  pre-meal.   - HbA1c 7.4%  - c/w lantus to 60u qHS  - c/w pre-meal humalog 8u qAC, hold if NPO  - c/w sliding scale  - monitor FS qAC + qHS

## 2020-06-23 NOTE — DISCHARGE NOTE PROVIDER - PROVIDER TOKENS
PROVIDER:[TOKEN:[[id number]]] PROVIDER:[TOKEN:[[id number]]],PROVIDER:[TOKEN:[[id number]]]

## 2020-06-23 NOTE — PHYSICAL THERAPY INITIAL EVALUATION ADULT - PLANNED THERAPY INTERVENTIONS, PT EVAL
strengthening/transfer training/balance training/bed mobility training/gait training/Pt will negotiate up/down 5  steps independently with appropriate assistive device and railing in 4 weeks

## 2020-06-23 NOTE — DISCHARGE NOTE PROVIDER - NSDCFUADDAPPT_GEN_ALL_CORE_FT
Podiatry Discharge Instructions:  Follow up: Please follow up with Dr. Srinivasan within 1 week of discharge from the hospital, please call [phone] for appointment and discuss that you recently were seen in the hospital.  Wound Care: Please apply packing to the Right dorsal foot wound then dress with 4x4 gauze and paul after saline irrigation daily by home nurse.   Weight bearing: Please weight bear as tolerated in a surgical shoe.  Antibiotics: Please continue as instructed. Podiatry Discharge Instructions:  Follow up: Please follow up with Dr. Srinivasan within 1 week of discharge from the hospital, please call [phone] for appointment and discuss that you recently were seen in the hospital.  Wound Care: Please pack with 1/4 inch iodoform packing to the Right foot dorsal wound then redress with 4x4 gauze and paul after saline irrigation daily by home nurse.   Weight bearing: Please weight bear as tolerated in a surgical shoe.  Antibiotics: Please continue as instructed. Podiatry Discharge Instructions:  Follow up: Please follow up with Dr. Srinivasan within 1 week of discharge from the hospital, please call [place] office: [phone]  [place] office: [phone] for appointment and discuss that you recently were seen in the hospital.  Wound Care: Please pack with 1/4 inch iodoform packing to the Right foot dorsal wound then redress with 4x4 gauze and paul after saline irrigation daily by home nurse.   Weight bearing: Please weight bear as tolerated in a surgical shoe.  Antibiotics: Please continue as instructed. Follow up: Please follow up with Dr. Srinivasan within 1 week of discharge from the hospital, please call [place] office: [phone] Follow up: Please follow up with Podiatrist Dr. Srinivasan within 1 week of discharge from the hospital, please call [place] office: [phone]

## 2020-06-23 NOTE — PROGRESS NOTE ADULT - SUBJECTIVE AND OBJECTIVE BOX
Podiatry pager #: [facility] [phone]/ [phone]    Patient is a 65y old  Male who presents with a chief complaint of right foot diabetic ulcer (22 Jun 2020 14:03)       INTERVAL HPI/OVERNIGHT EVENTS:  Patient seen and evaluated at bedside.  Pt is resting comfortable in NAD. Denies N/V/F/C.      Allergies    No Known Allergies    Intolerances        Vital Signs Last 24 Hrs  T(C): 36.4 (23 Jun 2020 05:21), Max: 36.9 (22 Jun 2020 13:43)  T(F): 97.6 (23 Jun 2020 05:21), Max: 98.4 (22 Jun 2020 13:43)  HR: 69 (23 Jun 2020 09:11) (64 - 83)  BP: 91/60 (23 Jun 2020 09:11) (91/60 - 126/74)  BP(mean): --  RR: 18 (23 Jun 2020 05:21) (18 - 18)  SpO2: 98% (23 Jun 2020 05:21) (98% - 98%)    LABS:                        12.4   7.52  )-----------( 133      ( 23 Jun 2020 07:29 )             38.3     06-23    138  |  106  |  14  ----------------------------<  146<H>  3.9   |  25  |  0.66    Ca    8.7      23 Jun 2020 06:46  Phos  2.9     06-22  Mg     2.2     06-22    TPro  6.1  /  Alb  3.7  /  TBili  0.2  /  DBili  x   /  AST  9<L>  /  ALT  6<L>  /  AlkPhos  75  06-21        CAPILLARY BLOOD GLUCOSE      POCT Blood Glucose.: 142 mg/dL (23 Jun 2020 08:46)  POCT Blood Glucose.: 174 mg/dL (22 Jun 2020 20:57)  POCT Blood Glucose.: 173 mg/dL (22 Jun 2020 16:57)  POCT Blood Glucose.: 136 mg/dL (22 Jun 2020 13:16)      Lower Extremity Physical Exam:    Vasc: DP/PT 1/4 b/l, TG warm to cool, R foot mildly edematous,   Neuro: Sensation diminished to level of midfoot b/l   MSK: anterior cavus b/l  Derm:    Wound #1:  Location: RF sub met 3-4  Size: 1.0 cm x 1.0 cm   Etiology: diabetic neuropathy/ chronic pressure   Depth: to subq  Wound bed: granular  Drainage: none   Odor: noen   Periwound: HPK    Wound #2:  Location: R foot dorsal 4th MPJ wound s/p incision and drainage  Size: 1.0 cm x 1.0 cm   Etiology: infection   Depth: to periosteum  Wound bed: granular/ fibrous   Drainage: scant purulence   Odor: None   Periwound: cellulitic and edematous improved since yesterday      RADIOLOGY & ADDITIONAL TESTS:      < from: MR Foot w/wo IV Cont, Right (06.22.20 @ 11:43) >  EXAM:  MR FOOT WAW IC RT                            PROCEDURE DATE:  06/22/2020            INTERPRETATION:  MR FOOT WITHOUT AND WITH IV CONTRAST RIGHT dated 6/22/2020 11:43 AM     INDICATION: Pain and swelling status post IND at the dorsal fourth MPJ abscess.    COMPARISON: Foot radiographs dated 6/8/2020. Right foot radiographs dated 6/21/2020    TECHNIQUE: Multi-sequential, multiplanar MRI imaging of the right midfoot and forefoot was performed per standard protocol. Images were obtained before and after the administration of IV contrast.  Contrast: Gadavist. Administered: 10 cc. Discarded: 0 cc.    FINDINGS:    BONE MARROW: There is no focal hypointense T1 bone marrow signal observed. No fracture or reactive bone marrow edema noted.  SYNOVIUM/ JOINT FLUID: There is no joint effusion.  TENDONS: The tendons are intact. No full-thickness tendon tear or retraction is seen.  MUSCLES: Moderate atrophy the intrinsic musculature the foot.  NEUROVASCULAR STRUCTURES: Preserved  SUBCUTANEOUS SOFT TISSUES: There is a skin defect along the dorsum of the third webspace extending towards the fourth digit. Small amount of packing material is appreciated over the wound. There is no drainable fluid collection noted.    IMPRESSION:    1.  Skin defect which may reflect ulcer or recent I&D surgical site. No drainable fluid collection.  2.  No abnormal marrow signal to suggest osteomyelitis.                    CRISS ROBLEDO M.D., ATTENDING RADIOLOGIST  This document has been electronically signed.Jun 22 2020 12:57PM    < end of copied text >

## 2020-06-23 NOTE — PROGRESS NOTE ADULT - SUBJECTIVE AND OBJECTIVE BOX
Podiatry pager #: [facility] [phone]/ [id number] [id number]    Patient is a 65y old  Male who presents with a chief complaint of right foot diabetic ulcer (22 Jun 2020 14:03)       INTERVAL HPI/OVERNIGHT EVENTS:  Patient seen and evaluated at bedside.  Pt is resting comfortable in NAD. Denies N/V/F/C.      Allergies    No Known Allergies    Intolerances        Vital Signs Last 24 Hrs  T(C): 36.4 (23 Jun 2020 05:21), Max: 36.9 (22 Jun 2020 13:43)  T(F): 97.6 (23 Jun 2020 05:21), Max: 98.4 (22 Jun 2020 13:43)  HR: 69 (23 Jun 2020 09:11) (64 - 83)  BP: 91/60 (23 Jun 2020 09:11) (91/60 - 126/74)  BP(mean): --  RR: 18 (23 Jun 2020 05:21) (18 - 18)  SpO2: 98% (23 Jun 2020 05:21) (98% - 98%)    LABS:                        12.4   7.52  )-----------( 133      ( 23 Jun 2020 07:29 )             38.3     06-23    138  |  106  |  14  ----------------------------<  146<H>  3.9   |  25  |  0.66    Ca    8.7      23 Jun 2020 06:46  Phos  2.9     06-22  Mg     2.2     06-22    TPro  6.1  /  Alb  3.7  /  TBili  0.2  /  DBili  x   /  AST  9<L>  /  ALT  6<L>  /  AlkPhos  75  06-21        CAPILLARY BLOOD GLUCOSE      POCT Blood Glucose.: 142 mg/dL (23 Jun 2020 08:46)  POCT Blood Glucose.: 174 mg/dL (22 Jun 2020 20:57)  POCT Blood Glucose.: 173 mg/dL (22 Jun 2020 16:57)  POCT Blood Glucose.: 136 mg/dL (22 Jun 2020 13:16)      Lower Extremity Physical Exam:    Vasc: DP/PT 1/4 b/l, TG warm to cool, R foot mildly edematous,   Neuro: Sensation diminished to level of midfoot b/l   MSK: anterior cavus b/l  Derm:    Wound #1:  Location: RF sub met 3-4  Size: 1.0 cm x 1.0 cm   Etiology: diabetic neuropathy/ chronic pressure   Depth: to subq  Wound bed: granular  Drainage: none   Odor: noen   Periwound: HPK    Wound #2:  Location: R foot dorsal 4th MPJ wound s/p incision and drainage  Size: 1.0 cm x 1.0 cm   Etiology: infection   Depth: to periosteum  Wound bed: granular/ fibrous   Drainage: scant purulence   Odor: None   Periwound: cellulitic and edematous improved since yesterday      RADIOLOGY & ADDITIONAL TESTS:      < from: MR Foot w/wo IV Cont, Right (06.22.20 @ 11:43) >  EXAM:  MR FOOT WAW IC RT                            PROCEDURE DATE:  06/22/2020            INTERPRETATION:  MR FOOT WITHOUT AND WITH IV CONTRAST RIGHT dated 6/22/2020 11:43 AM     INDICATION: Pain and swelling status post IND at the dorsal fourth MPJ abscess.    COMPARISON: Foot radiographs dated 6/8/2020. Right foot radiographs dated 6/21/2020    TECHNIQUE: Multi-sequential, multiplanar MRI imaging of the right midfoot and forefoot was performed per standard protocol. Images were obtained before and after the administration of IV contrast.  Contrast: Gadavist. Administered: 10 cc. Discarded: 0 cc.    FINDINGS:    BONE MARROW: There is no focal hypointense T1 bone marrow signal observed. No fracture or reactive bone marrow edema noted.  SYNOVIUM/ JOINT FLUID: There is no joint effusion.  TENDONS: The tendons are intact. No full-thickness tendon tear or retraction is seen.  MUSCLES: Moderate atrophy the intrinsic musculature the foot.  NEUROVASCULAR STRUCTURES: Preserved  SUBCUTANEOUS SOFT TISSUES: There is a skin defect along the dorsum of the third webspace extending towards the fourth digit. Small amount of packing material is appreciated over the wound. There is no drainable fluid collection noted.    IMPRESSION:    1.  Skin defect which may reflect ulcer or recent I&D surgical site. No drainable fluid collection.  2.  No abnormal marrow signal to suggest osteomyelitis.                    CRISS ROBLEDO M.D., ATTENDING RADIOLOGIST  This document has been electronically signed.Jun 22 2020 12:57PM    < end of copied text >

## 2020-06-23 NOTE — DISCHARGE NOTE PROVIDER - HOSPITAL COURSE
66 yo male with PMH of HTN, DMT2 with neuropathy, CAD s/p CABG, presents with Right foot abscess/cellulitis failing outpatient PO antibiotics.        Diabetic foot infection.  Plan: now s/p I&D by Podiatry     - no plans for surgical intervention at this time    - MRSA PCR negative, vancomycin d/c'ed    - c/w [id number] q8h as per ID    - f/u wound cultures obtained by podiatry with NGTD    - f/u blood cultures NGTD    - MRI of R foot with no evidence of osteomyelitis    - ABIs with no definite lower extremity arterial disease identified, but suboptimal study due to noncompressible vessels.        Type 2 diabetes mellitus with other specified complication, unspecified whether long term insulin use.  Plan: Hold Metformin. Patient states on Tresiba 80u qHS and humalog 10u  pre-meal.     - HbA1c 7.4%    - c/w lantus to 60u qHS    - c/w pre-meal humalog 8u qAC, hold if NPO    - c/w sliding scale    - monitor FS qAC + qHS.         CAD (coronary artery disease).  Plan: s/p CABG in 2009.    - c/w ASA and statin    - c/w metoprolol ER 25mg PO daily    - on ramipril 2.5mg at home, hold in setting of episode of hypotension.         HTN (hypertension).  Plan: episode of hypotension this AM.    - c/w metoprolol as above    - hold lisinopril. 64 yo male with PMH of HTN, DMT2 with neuropathy, CAD s/p CABG, presents with Right foot abscess/cellulitis failing outpatient PO antibiotics treated with IV abx inpatient and s/p I&D with podiatry. cultures with NGTD and clinically improved. medically stable for discharge with PO bactrim and cipro as per ID.            Diabetic foot infection.  Plan: now s/p I&D by Podiatry     - no plans for surgical intervention at this time    - MRSA PCR negative, vancomycin d/c'ed    - will need to follow-up with his podiatry within 1 week    - dressing changes as per podiatry recs    - MRSA PCR negative, vancomycin d/c'ed    - s/p cefepime inpatient. discussed with ID attending bedside; will discharge on DS bactrim and cipro x 7 days.    - f/u wound cultures obtained by podiatry with NGTD    - f/u blood cultures NGTD    - MRI of R foot with no evidence of osteomyelitis    - ABIs with no definite lower extremity arterial disease identified, but suboptimal study due to noncompressible vessels            Type 2 diabetes mellitus with other specified complication, unspecified whether long term insulin use.  Plan: Hold Metformin. Patient states on Tresiba 80u qHS and humalog 10u  pre-meal.     - HbA1c 7.4%    - c/w lantus to 60u qHS    - c/w pre-meal humalog 8u qAC, hold if NPO    - c/w sliding scale    - monitor FS qAC + qHS.         CAD (coronary artery disease).  Plan: s/p CABG in 2009.    - c/w ASA and statin    - c/w metoprolol ER 25mg PO daily    - on ramipril 2.5mg at home, hold in setting of episode of hypotension.         HTN (hypertension).  Plan: episode of hypotension this AM.    - c/w metoprolol as above    - hold lisinopril.

## 2020-06-23 NOTE — PHYSICAL THERAPY INITIAL EVALUATION ADULT - LIVES WITH, PROFILE
Daughter lives upstairs. Helps with shopping, meals, laundry. Pt lives in a house with 5 steps to enter with a railing. Ambulatory with a cane at home. Pt states he is still driving/alone

## 2020-06-23 NOTE — PHYSICAL THERAPY INITIAL EVALUATION ADULT - ADDITIONAL COMMENTS
R foot sub met 3 diabetic ulcer w/ dorsal abscess * s/p bedside incision and drainage   Wound and blood cultures pending  MRI R foot: negative osteomyelitis   Arterial dopplers:  The quality of this examination is adversely impacted by the noncompressible nature of the diabetic arteries. No definite lower extremity arterial disease is identified.  CXR clear

## 2020-06-23 NOTE — PROGRESS NOTE ADULT - ASSESSMENT
60 yo male with right foot diabetic ulcer, 1d s/p I&D right foot    - pt seen and evaluated  - afebrile, no leukocytosis  - RFXR neg for evidence of OM or Subq gas  - Aseptic, excisional debridement of submet 3 ulceration to and including the level of fat.  Granular s/p debridement without any local signs of infection.  Erythema has resovled dorsally. I&D site stable without purulence.   - Flushed, packed, dressed w/ DSD  - No acute pod sx intervention   - pt stable to be d/c per podiatry on PO Abx - rec Bactrim as patient failed PO augmentin, MRSA negative, I&D culture swab still pending with no organisms currently  - MRI 6/22/2020 No abnormal marrow signal to suggest osteomyelitis, no abscess.    Patient to follow with outside podiatrist in [place], known to patient for 20 years.  Advised patient to see him within 1 week of D/C.  If any issues scheduling appointment patient may come to my office as an alternative.   [place] office: [phone]  [place] office: [phone]

## 2020-06-23 NOTE — PROGRESS NOTE ADULT - PROBLEM SELECTOR PLAN 3
s/p CABG in 2009.  - c/w ASA and statin  - c/w metoprolol ER 25mg PO daily  - on ramipril 2.5mg at home, hold in setting of episode of hypotension

## 2020-06-24 VITALS
SYSTOLIC BLOOD PRESSURE: 114 MMHG | OXYGEN SATURATION: 98 % | HEART RATE: 84 BPM | TEMPERATURE: 98 F | DIASTOLIC BLOOD PRESSURE: 68 MMHG

## 2020-06-24 LAB
CULTURE RESULTS: SIGNIFICANT CHANGE UP
GLUCOSE BLDC GLUCOMTR-MCNC: 111 MG/DL — HIGH (ref 70–99)
GLUCOSE BLDC GLUCOMTR-MCNC: 140 MG/DL — HIGH (ref 70–99)
SPECIMEN SOURCE: SIGNIFICANT CHANGE UP

## 2020-06-24 PROCEDURE — 93005 ELECTROCARDIOGRAM TRACING: CPT

## 2020-06-24 PROCEDURE — 87641 MR-STAPH DNA AMP PROBE: CPT

## 2020-06-24 PROCEDURE — 96374 THER/PROPH/DIAG INJ IV PUSH: CPT

## 2020-06-24 PROCEDURE — 87070 CULTURE OTHR SPECIMN AEROBIC: CPT

## 2020-06-24 PROCEDURE — 99285 EMERGENCY DEPT VISIT HI MDM: CPT | Mod: 25

## 2020-06-24 PROCEDURE — 80048 BASIC METABOLIC PNL TOTAL CA: CPT

## 2020-06-24 PROCEDURE — 86803 HEPATITIS C AB TEST: CPT

## 2020-06-24 PROCEDURE — 71045 X-RAY EXAM CHEST 1 VIEW: CPT

## 2020-06-24 PROCEDURE — 85014 HEMATOCRIT: CPT

## 2020-06-24 PROCEDURE — 93923 UPR/LXTR ART STDY 3+ LVLS: CPT

## 2020-06-24 PROCEDURE — 82565 ASSAY OF CREATININE: CPT

## 2020-06-24 PROCEDURE — 87040 BLOOD CULTURE FOR BACTERIA: CPT

## 2020-06-24 PROCEDURE — 85027 COMPLETE CBC AUTOMATED: CPT

## 2020-06-24 PROCEDURE — 82803 BLOOD GASES ANY COMBINATION: CPT

## 2020-06-24 PROCEDURE — 97116 GAIT TRAINING THERAPY: CPT

## 2020-06-24 PROCEDURE — 87640 STAPH A DNA AMP PROBE: CPT

## 2020-06-24 PROCEDURE — 73720 MRI LWR EXTREMITY W/O&W/DYE: CPT

## 2020-06-24 PROCEDURE — 84132 ASSAY OF SERUM POTASSIUM: CPT

## 2020-06-24 PROCEDURE — 82947 ASSAY GLUCOSE BLOOD QUANT: CPT

## 2020-06-24 PROCEDURE — 73630 X-RAY EXAM OF FOOT: CPT

## 2020-06-24 PROCEDURE — 84100 ASSAY OF PHOSPHORUS: CPT

## 2020-06-24 PROCEDURE — 87205 SMEAR GRAM STAIN: CPT

## 2020-06-24 PROCEDURE — 82330 ASSAY OF CALCIUM: CPT

## 2020-06-24 PROCEDURE — 83605 ASSAY OF LACTIC ACID: CPT

## 2020-06-24 PROCEDURE — 97161 PT EVAL LOW COMPLEX 20 MIN: CPT

## 2020-06-24 PROCEDURE — 83735 ASSAY OF MAGNESIUM: CPT

## 2020-06-24 PROCEDURE — 99239 HOSP IP/OBS DSCHRG MGMT >30: CPT

## 2020-06-24 PROCEDURE — 86140 C-REACTIVE PROTEIN: CPT

## 2020-06-24 PROCEDURE — 80053 COMPREHEN METABOLIC PANEL: CPT

## 2020-06-24 PROCEDURE — 82962 GLUCOSE BLOOD TEST: CPT

## 2020-06-24 PROCEDURE — 99232 SBSQ HOSP IP/OBS MODERATE 35: CPT

## 2020-06-24 PROCEDURE — 97530 THERAPEUTIC ACTIVITIES: CPT

## 2020-06-24 PROCEDURE — 83036 HEMOGLOBIN GLYCOSYLATED A1C: CPT

## 2020-06-24 PROCEDURE — 82435 ASSAY OF BLOOD CHLORIDE: CPT

## 2020-06-24 PROCEDURE — A9585: CPT

## 2020-06-24 PROCEDURE — 84295 ASSAY OF SERUM SODIUM: CPT

## 2020-06-24 RX ORDER — TAMSULOSIN HYDROCHLORIDE 0.4 MG/1
1 CAPSULE ORAL
Qty: 0 | Refills: 0 | DISCHARGE

## 2020-06-24 RX ORDER — NYSTATIN CREAM 100000 [USP'U]/G
1 CREAM TOPICAL
Qty: 30 | Refills: 0
Start: 2020-06-24 | End: 2020-06-30

## 2020-06-24 RX ORDER — INSULIN DEGLUDEC 100 U/ML
60 INJECTION, SOLUTION SUBCUTANEOUS
Qty: 0 | Refills: 0 | DISCHARGE

## 2020-06-24 RX ORDER — TAMSULOSIN HYDROCHLORIDE 0.4 MG/1
1 CAPSULE ORAL
Qty: 0 | Refills: 0 | DISCHARGE
Start: 2020-06-24

## 2020-06-24 RX ORDER — INSULIN LISPRO 100/ML
8 VIAL (ML) SUBCUTANEOUS
Qty: 0 | Refills: 0 | DISCHARGE

## 2020-06-24 RX ORDER — METFORMIN HYDROCHLORIDE 850 MG/1
1 TABLET ORAL
Qty: 0 | Refills: 0 | DISCHARGE

## 2020-06-24 RX ORDER — MOXIFLOXACIN HYDROCHLORIDE TABLETS, 400 MG 400 MG/1
1 TABLET, FILM COATED ORAL
Qty: 14 | Refills: 0
Start: 2020-06-24 | End: 2020-06-30

## 2020-06-24 RX ORDER — LINEZOLID 600 MG/300ML
1 INJECTION, SOLUTION INTRAVENOUS
Qty: 14 | Refills: 0
Start: 2020-06-24 | End: 2020-06-30

## 2020-06-24 RX ORDER — AZTREONAM 2 G
1 VIAL (EA) INJECTION
Qty: 14 | Refills: 0
Start: 2020-06-24 | End: 2020-06-30

## 2020-06-24 RX ORDER — INSULIN DEGLUDEC 100 U/ML
80 INJECTION, SOLUTION SUBCUTANEOUS
Qty: 0 | Refills: 0 | DISCHARGE

## 2020-06-24 RX ORDER — NYSTATIN CREAM 100000 [USP'U]/G
1 CREAM TOPICAL
Qty: 0 | Refills: 0 | DISCHARGE
Start: 2020-06-24

## 2020-06-24 RX ADMIN — Medication 81 MILLIGRAM(S): at 11:43

## 2020-06-24 RX ADMIN — CEFEPIME 100 MILLIGRAM(S): 1 INJECTION, POWDER, FOR SOLUTION INTRAMUSCULAR; INTRAVENOUS at 05:27

## 2020-06-24 RX ADMIN — NYSTATIN CREAM 1 APPLICATION(S): 100000 CREAM TOPICAL at 05:28

## 2020-06-24 RX ADMIN — HEPARIN SODIUM 5000 UNIT(S): 5000 INJECTION INTRAVENOUS; SUBCUTANEOUS at 05:28

## 2020-06-24 RX ADMIN — Medication 150 MILLIGRAM(S): at 05:27

## 2020-06-24 RX ADMIN — Medication 25 MILLIGRAM(S): at 05:28

## 2020-06-24 RX ADMIN — Medication 650 MILLIGRAM(S): at 05:33

## 2020-06-24 NOTE — PROGRESS NOTE ADULT - SUBJECTIVE AND OBJECTIVE BOX
[facility] Division of Hospital Medicine  Caren Aaron MD  Pager (M-F, 6D-7G): [phone]  Other Times:  [phone]      Patient is a 65y old  Male who presents with a chief complaint of right foot diabetic ulcer (23 Jun 2020 15:16)      SUBJECTIVE / OVERNIGHT EVENTS: no acute events overnight. no fevers nor chills. R foot with minimal swelling. overall improved. patient requesting to go home.  ADDITIONAL REVIEW OF SYSTEMS:    MEDICATIONS  (STANDING):  aspirin enteric coated 81 milliGRAM(s) Oral daily  atorvastatin 80 milliGRAM(s) Oral at bedtime  cefepime   IVPB 1000 milliGRAM(s) IV Intermittent every 8 hours  dextrose 5%. 1000 milliLiter(s) (50 mL/Hr) IV Continuous <Continuous>  dextrose 50% Injectable 12.5 Gram(s) IV Push once  dextrose 50% Injectable 25 Gram(s) IV Push once  dextrose 50% Injectable 25 Gram(s) IV Push once  heparin   Injectable 5000 Unit(s) SubCutaneous every 8 hours  insulin glargine Injectable (LANTUS) 60 Unit(s) SubCutaneous at bedtime  insulin lispro (HumaLOG) corrective regimen sliding scale   SubCutaneous three times a day before meals  insulin lispro (HumaLOG) corrective regimen sliding scale   SubCutaneous at bedtime  insulin lispro Injectable (HumaLOG) 8 Unit(s) SubCutaneous three times a day before meals  metoprolol succinate ER 25 milliGRAM(s) Oral daily  nystatin Cream 1 Application(s) Topical every 12 hours  pregabalin 150 milliGRAM(s) Oral three times a day  tamsulosin 0.4 milliGRAM(s) Oral at bedtime    MEDICATIONS  (PRN):  acetaminophen   Tablet .. 650 milliGRAM(s) Oral every 6 hours PRN Temp greater or equal to 38C (100.4F), Mild Pain (1 - 3)  dextrose 40% Gel 15 Gram(s) Oral once PRN Blood Glucose LESS THAN 70 milliGRAM(s)/deciliter  glucagon  Injectable 1 milliGRAM(s) IntraMuscular once PRN Glucose LESS THAN 70 milligrams/deciliter      CAPILLARY BLOOD GLUCOSE      POCT Blood Glucose.: 111 mg/dL (24 Jun 2020 12:18)  POCT Blood Glucose.: 140 mg/dL (24 Jun 2020 08:27)  POCT Blood Glucose.: 213 mg/dL (23 Jun 2020 21:54)  POCT Blood Glucose.: 113 mg/dL (23 Jun 2020 17:32)    I&O's Summary    23 Jun 2020 07:01  -  24 Jun 2020 07:00  --------------------------------------------------------  IN: 600 mL / OUT: 1525 mL / NET: -925 mL        PHYSICAL EXAM:  Vital Signs Last 24 Hrs  T(C): 36.6 (24 Jun 2020 05:20), Max: 37 (23 Jun 2020 21:24)  T(F): 97.9 (24 Jun 2020 05:20), Max: 98.6 (23 Jun 2020 21:24)  HR: 63 (24 Jun 2020 05:20) (63 - 71)  BP: 110/69 (24 Jun 2020 05:20) (105/69 - 120/68)  BP(mean): --  RR: 18 (24 Jun 2020 05:20) (18 - 18)  SpO2: 98% (24 Jun 2020 05:20) (96% - 98%)    CONSTITUTIONAL: NAD, well-developed, well-groomed  EYES: PERRLA; conjunctiva and sclera clear  ENMT: Moist oral mucosa  Supple, no palpable masses; no thyromegaly  RESPIRATORY: Normal respiratory effort; lungs are clear to auscultation bilaterally  CARDIOVASCULAR: Regular rate and rhythm, normal S1 and S2, no murmur/rub/gallop; No lower extremity edema  ABDOMEN: Soft, Nondistended,  Nontender to palpation, normoactive bowel sounds  MUSCULOSKELETAL:  No clubbing or cyanosis of digits; no joint swelling or tenderness to palpation  PSYCH: A+O to person, place, and time; affect appropriate  NEUROLOGY: CN 2-12 are intact and symmetric; no gross sensory deficits   SKIN: +right foot wound with dorsal wound with packing, no drainage, minimal erythema, plantar aspect with healed diabetic ulcer    LABS:                        12.4   7.52  )-----------( 133      ( 23 Jun 2020 07:29 )             38.3     06-23    138  |  106  |  14  ----------------------------<  146<H>  3.9   |  25  |  0.66    Ca    8.7      23 Jun 2020 06:46                Culture - Abscess with Gram Stain (collected 22 Jun 2020 05:33)  Source: .Abscess right foot  Preliminary Report (23 Jun 2020 08:45):    No growth    Culture - Blood (collected 22 Jun 2020 04:30)  Source: .Blood Blood-Peripheral  Preliminary Report (23 Jun 2020 05:01):    No growth to date.    Culture - Blood (collected 22 Jun 2020 04:30)  Source: .Blood Blood-Venous  Preliminary Report (23 Jun 2020 05:01):    No growth to date.        RADIOLOGY & ADDITIONAL TESTS:  Results Reviewed:  abscess culture NGTD, blood cultures x 2 with NGTD  Imaging Personally Reviewed:  Electrocardiogram Personally Reviewed:    COORDINATION OF CARE:  Care Discussed with Consultants/Other Providers [Y]: ID attending Dr. Heredia  Prior or Outpatient Records Reviewed [Y/N]:

## 2020-06-24 NOTE — PROGRESS NOTE ADULT - PROBLEM SELECTOR PLAN 1
now s/p I&D by Podiatry.  - Podiatry following, recs appreciated   - no plans for surgical intervention at this time  - will need to follow-up with his podiatry within 1 week  - dressing changes as per podiatry recs  - MRSA PCR negative, vancomycin d/c'ed  - s/p cefepime inpatient. discussed with ID attending bedside; will discharge on DS bactrim and cipro x 7 days.  - f/u wound cultures obtained by podiatry with NGTD  - f/u blood cultures NGTD  - MRI of R foot with no evidence of osteomyelitis  - ABIs with no definite lower extremity arterial disease identified, but suboptimal study due to noncompressible vessels

## 2020-06-24 NOTE — PROGRESS NOTE ADULT - NSHPATTENDINGPLANDISCUSS_GEN_ALL_CORE
medicine attending, np
medicine np
patient and medicine NP Maicol
patient and medicine NP Maicol
patient, ID attending Dr. Heredia, and medicine NP Funmilayo

## 2020-06-24 NOTE — PROGRESS NOTE ADULT - REASON FOR ADMISSION
right foor diabetic ulcer
right foot diabetic ulcer
right foor diabetic ulcer
right foor diabetic ulcer
right foot diabetic ulcer

## 2020-06-24 NOTE — CHART NOTE - NSCHARTNOTEFT_GEN_A_CORE
abscess culture returned later this afternoon growing gemella mornillorum  discussed with ID attending above    was unable to reach patient via phone, but spoke to his daugther Yelitza ([phone])    informed her we will be sending linezolid 600mg BID x 7 days which he should take    she did not  the bactrim and cipro yet and knows NOT to  and disregard.    linezolid sent to [facility] in [place] on [address] at her request which she will  tonight

## 2020-06-24 NOTE — PROGRESS NOTE ADULT - ASSESSMENT
60 yo male with right foot diabetic ulcer, 2d s/p I&D right foot    - afebrile, no leukocytosis  - RFXR neg for evidence of OM or Subq gas  - per ID D/C on cipro abd bactrim empirically for 7 days and follow as outpt - agree with recs  - No acute pod sx intervention   - MRI 6/22/2020 No abnormal marrow signal to suggest osteomyelitis, no abscess.    Patient to follow with outside podiatrist in [place], known to patient for 20 years.  Advised patient to see him within 1 week of D/C.  If any issues scheduling appointment patient may come to my office as an alternative.   [place] office: [phone]  [place] office: [phone] Clear bilaterally, pupils equal, round and reactive to light.

## 2020-06-24 NOTE — PROGRESS NOTE ADULT - ASSESSMENT
65/M with PMH DM2 with neuropathy, CAD s/p CABG 2009 and stents 2018, HTN, HLD, L5-S1 spinal stenosis s/p fusion 2007.  - 6/8 [facility] ED visit with rt foot ulcer X1 day. Eval by Podiatry - s/p aseptic excisional debridement of hyperkeratotic lesion down to subq. Discharged on PO Augmentin X 10 days  - Saw Podiatrist Friday 6/19 - abx course extended  - over the weekend, developed rt foot blister, so came to ER.  - In ED, afebrile and hemodynamically stable. Eval by poditary in ER - I&D performed down to the level of subq, with 3cc purulence expressed. ID consulted for abx recs  ==========  Rt foot diabetic infection and cellulitis of RLE  - infection developed despite 10 day course of PO Augmentin  - Xray and MRI showed no s/o osteomyelitis  - CRP 1.18. CXR clear, Covid-19 PCR negative  - will cover broadly for now, pending cx results      Overall rt foot cellulitis, diabetic foot ulcer, leucocytosis resolved.       RECOMMENDATIONS:  1. RLE cellulitis  - cx showing gemella   pt seen earlier in the morning, wanted to go home today, did not want to wait fo cx, understood the risk of inappropriate therapy pending abscess cx and worsening infection. Verbalized understanding, planned for discharge on cipro abd bactrim empirically for 7 days and follow as outpt.   pt now discharged on above regimen, will call and switch therapy to linezolid.   - f/u blood cx, NTD   - podiatry on board, pt to follow with them closely.   - MRI with no OM.       2. Intertriginous fungal infection  - recommend topical Nystatin cream q12h local application between toes

## 2020-06-24 NOTE — PROGRESS NOTE ADULT - SUBJECTIVE AND OBJECTIVE BOX
Podiatry pager #: [facility] [phone]/ [id number]    Patient is a 65y old  Male who presents with a chief complaint of right foot diabetic ulcer (22 Jun 2020 14:03)       INTERVAL HPI/OVERNIGHT EVENTS:  Patient seen and evaluated at bedside.  Pt is resting comfortable in NAD. Denies N/V/F/C.      Allergies    No Known Allergies    Intolerances        Vital Signs Last 24 Hrs  T(C): 36.4 (23 Jun 2020 05:21), Max: 36.9 (22 Jun 2020 13:43)  T(F): 97.6 (23 Jun 2020 05:21), Max: 98.4 (22 Jun 2020 13:43)  HR: 69 (23 Jun 2020 09:11) (64 - 83)  BP: 91/60 (23 Jun 2020 09:11) (91/60 - 126/74)  BP(mean): --  RR: 18 (23 Jun 2020 05:21) (18 - 18)  SpO2: 98% (23 Jun 2020 05:21) (98% - 98%)    LABS:                        12.4   7.52  )-----------( 133      ( 23 Jun 2020 07:29 )             38.3     06-23    138  |  106  |  14  ----------------------------<  146<H>  3.9   |  25  |  0.66    Ca    8.7      23 Jun 2020 06:46  Phos  2.9     06-22  Mg     2.2     06-22    TPro  6.1  /  Alb  3.7  /  TBili  0.2  /  DBili  x   /  AST  9<L>  /  ALT  6<L>  /  AlkPhos  75  06-21        CAPILLARY BLOOD GLUCOSE      POCT Blood Glucose.: 142 mg/dL (23 Jun 2020 08:46)  POCT Blood Glucose.: 174 mg/dL (22 Jun 2020 20:57)  POCT Blood Glucose.: 173 mg/dL (22 Jun 2020 16:57)  POCT Blood Glucose.: 136 mg/dL (22 Jun 2020 13:16)      Lower Extremity Physical Exam:    Vasc: DP/PT 1/4 b/l, TG warm to cool, R foot mildly edematous,   Neuro: Sensation diminished to level of midfoot b/l   MSK: anterior cavus b/l  Derm:    Wound #1:  Location: RF sub met 3-4  Size: 1.0 cm x 1.0 cm   Etiology: diabetic neuropathy/ chronic pressure   Depth: to subq  Wound bed: granular  Drainage: none   Odor: noen   Periwound: HPK    Wound #2:  Location: R foot dorsal 4th MPJ wound s/p incision and drainage  Size: 1.0 cm x 1.0 cm   Etiology: infection   Depth: to periosteum  Wound bed: granular/ fibrous   Drainage: scant purulence   Odor: None   Periwound: cellulitic and edematous improved since yesterday      RADIOLOGY & ADDITIONAL TESTS:      < from: MR Foot w/wo IV Cont, Right (06.22.20 @ 11:43) >  EXAM:  MR FOOT WAW IC RT                            PROCEDURE DATE:  06/22/2020            INTERPRETATION:  MR FOOT WITHOUT AND WITH IV CONTRAST RIGHT dated 6/22/2020 11:43 AM     INDICATION: Pain and swelling status post IND at the dorsal fourth MPJ abscess.    COMPARISON: Foot radiographs dated 6/8/2020. Right foot radiographs dated 6/21/2020    TECHNIQUE: Multi-sequential, multiplanar MRI imaging of the right midfoot and forefoot was performed per standard protocol. Images were obtained before and after the administration of IV contrast.  Contrast: Gadavist. Administered: 10 cc. Discarded: 0 cc.    FINDINGS:    BONE MARROW: There is no focal hypointense T1 bone marrow signal observed. No fracture or reactive bone marrow edema noted.  SYNOVIUM/ JOINT FLUID: There is no joint effusion.  TENDONS: The tendons are intact. No full-thickness tendon tear or retraction is seen.  MUSCLES: Moderate atrophy the intrinsic musculature the foot.  NEUROVASCULAR STRUCTURES: Preserved  SUBCUTANEOUS SOFT TISSUES: There is a skin defect along the dorsum of the third webspace extending towards the fourth digit. Small amount of packing material is appreciated over the wound. There is no drainable fluid collection noted.    IMPRESSION:    1.  Skin defect which may reflect ulcer or recent I&D surgical site. No drainable fluid collection.  2.  No abnormal marrow signal to suggest osteomyelitis.                    CRISS ROBLEDO M.D., ATTENDING RADIOLOGIST  This document has been electronically signed.Jun 22 2020 12:57PM    < end of copied text >

## 2020-06-24 NOTE — PROGRESS NOTE ADULT - PROBLEM SELECTOR PLAN 3
s/p CABG in 2009.  - c/w ASA and statin  - c/w metoprolol ER 25mg PO daily  - on ramipril 2.5mg at home, hold in setting of episode of hypotension - patient to continue to hold on discharge until follows up with PCP Dr. Jose

## 2020-06-24 NOTE — PROGRESS NOTE ADULT - PROBLEM SELECTOR PLAN 2
Hold Metformin. Patient states on Tresiba 80u qHS and humalog 10u  pre-meal.   - HbA1c 7.4%  - c/w lantus to 60u qHS  - c/w pre-meal humalog 8u qAC, hold if NPO  - c/w sliding scale  - monitor FS qAC + qHS

## 2020-06-24 NOTE — PROGRESS NOTE ADULT - SUBJECTIVE AND OBJECTIVE BOX
65y old  Male who presents with a chief complaint of right foot diabetic ulcer (24 Jun 2020 12:54)      Interval history:  Afebrile, swelling and redness improving, wants to go home.       Allergies:   No Known Allergies    Antimicrobials:  cefepime   IVPB 1000 milliGRAM(s) IV Intermittent every 8 hours      REVIEW OF SYSTEMS:  No chest pain   No SOB  No N/V  No rash.       Vital Signs Last 24 Hrs  T(C): 36.6 (06-24-20 @ 14:10), Max: 37 (06-23-20 @ 21:24)  T(F): 97.9 (06-24-20 @ 14:10), Max: 98.6 (06-23-20 @ 21:24)  HR: 84 (06-24-20 @ 14:10) (63 - 84)  BP: 114/68 (06-24-20 @ 14:10) (110/69 - 120/68)  BP(mean): --  RR: 18 (06-24-20 @ 05:20) (18 - 18)  SpO2: 98% (06-24-20 @ 14:10) (98% - 98%)      PHYSICAL EXAM:  Patient in no acute distress. AAOX3.  No icterus, no oral ulcers.  Cardiovascular: S1S2 normal.  Lungs: + air entry B/L lung fields.  Gastrointestinal: soft, nontender, nondistended.  Extremities: Improved edema, rt foot I&D of abscess, decreased warmth and erythema.   IV sites not inflamed.                             12.4   7.52  )-----------( 133      ( 23 Jun 2020 07:29 )             38.3   06-23    138  |  106  |  14  ----------------------------<  146<H>  3.9   |  25  |  0.66    Ca    8.7      23 Jun 2020 06:46        Culture - Abscess with Gram Stain (collected 22 Jun 2020 05:33)  Source: .Abscess right foot  Final Report (24 Jun 2020 14:35):    Few Gemella morbillorum Susceptibilites not performed.    Culture - Blood (collected 22 Jun 2020 04:30)  Source: .Blood Blood-Peripheral  Preliminary Report (23 Jun 2020 05:01):    No growth to date.    Culture - Blood (collected 22 Jun 2020 04:30)  Source: .Blood Blood-Venous  Preliminary Report (23 Jun 2020 05:01):    No growth to date.

## 2020-06-24 NOTE — PROGRESS NOTE ADULT - PROBLEM SELECTOR PLAN 7
Transitions of Care Status:  1.  Name of PCP: Dr. Leobardo Jose  2.  PCP Contacted on Admission: [x] Y    [ ] N  called office on 6/23/20   3.  PCP contacted at Discharge: [ ] Y    [ ] N    [ ] N/A  4.  Post-Discharge Appointment Date and Location:  5.  Summary of Handoff given to PCP:
Transitions of Care Status:  1.  Name of PCP: Dr. Leobardo Jose  2.  PCP Contacted on Admission: [x] Y    [ ] N  called office on 6/23/20   3.  PCP contacted at Discharge: [ ] Y    [ ] N    [ ] N/A  4.  Post-Discharge Appointment Date and Location:  5.  Summary of Handoff given to PCP:

## 2020-06-24 NOTE — PROGRESS NOTE ADULT - ASSESSMENT
64 yo male with PMH of HTN, DMT2 with neuropathy, CAD s/p CABG, presents with Right foot abscess/cellulitis failing outpatient PO antibiotics treated with IV abx inpatient and s/p I&D with podiatry. cultures with NGTD and clinically improved. medically stable for discharge with PO bactrim and cipro as per ID.

## 2020-06-24 NOTE — PROGRESS NOTE ADULT - ATTENDING COMMENTS
Hunter Heredia  Pager: [phone]. If no response or past 5 pm call [phone].
Hunter Heredia  Pager: [phone]. If no response or past 5 pm call [phone].
Dr. Caren Aaron  Division of Hospital Medicine  [facility]   Pager: [phone]
Dr. Caren Aaron  Division of Hospital Medicine  [facility]   Pager: [phone]
Dr. Caren Aaron  Division of Hospital Medicine  [facility]   Pager: [phone]    discussed with patient and ID attending bedside  discharge on DS bactrim and cipro x 7 days  patient to call if any signs of worsening infection  needs f/u with podiatry and PCP within 1 week  wound care/dressing changes as per podiatry recs  hold ACEi on discharge until follows up with PCP (has been held due to hypotension now resolved)    discharge planning time spent 42 minutes

## 2020-08-15 ENCOUNTER — EMERGENCY (EMERGENCY)
Facility: HOSPITAL | Age: 65
LOS: 1 days | Discharge: HOME | End: 2020-08-15
Attending: EMERGENCY MEDICINE
Payer: MEDICARE

## 2020-08-15 VITALS
SYSTOLIC BLOOD PRESSURE: 123 MMHG | TEMPERATURE: 98 F | HEART RATE: 87 BPM | DIASTOLIC BLOOD PRESSURE: 75 MMHG | RESPIRATION RATE: 18 BRPM | OXYGEN SATURATION: 98 % | HEIGHT: 71 IN | WEIGHT: 229.94 LBS

## 2020-08-15 VITALS
HEART RATE: 76 BPM | SYSTOLIC BLOOD PRESSURE: 137 MMHG | OXYGEN SATURATION: 96 % | RESPIRATION RATE: 16 BRPM | DIASTOLIC BLOOD PRESSURE: 72 MMHG | TEMPERATURE: 98 F

## 2020-08-15 LAB
ALBUMIN SERPL ELPH-MCNC: 4 G/DL — SIGNIFICANT CHANGE UP (ref 3.3–5)
ALP SERPL-CCNC: 69 U/L — SIGNIFICANT CHANGE UP (ref 40–120)
ALT FLD-CCNC: 7 U/L — LOW (ref 10–45)
ANION GAP SERPL CALC-SCNC: 11 MMOL/L — SIGNIFICANT CHANGE UP (ref 5–17)
APTT BLD: 25.5 SEC — LOW (ref 27.5–35.5)
AST SERPL-CCNC: 11 U/L — SIGNIFICANT CHANGE UP (ref 10–40)
BASOPHILS # BLD AUTO: 0.01 K/UL — SIGNIFICANT CHANGE UP (ref 0–0.2)
BASOPHILS NFR BLD AUTO: 0.1 % — SIGNIFICANT CHANGE UP (ref 0–2)
BILIRUB SERPL-MCNC: 0.3 MG/DL — SIGNIFICANT CHANGE UP (ref 0.2–1.2)
BUN SERPL-MCNC: 25 MG/DL — HIGH (ref 7–23)
CALCIUM SERPL-MCNC: 9.7 MG/DL — SIGNIFICANT CHANGE UP (ref 8.4–10.5)
CHLORIDE SERPL-SCNC: 106 MMOL/L — SIGNIFICANT CHANGE UP (ref 96–108)
CO2 SERPL-SCNC: 26 MMOL/L — SIGNIFICANT CHANGE UP (ref 22–31)
CREAT SERPL-MCNC: 0.69 MG/DL — SIGNIFICANT CHANGE UP (ref 0.5–1.3)
CRP SERPL-MCNC: 0.28 MG/DL — SIGNIFICANT CHANGE UP (ref 0–0.4)
EOSINOPHIL # BLD AUTO: 0.21 K/UL — SIGNIFICANT CHANGE UP (ref 0–0.5)
EOSINOPHIL NFR BLD AUTO: 2.8 % — SIGNIFICANT CHANGE UP (ref 0–6)
ERYTHROCYTE [SEDIMENTATION RATE] IN BLOOD: 20 MM/HR — SIGNIFICANT CHANGE UP (ref 0–20)
GAS PNL BLDV: SIGNIFICANT CHANGE UP
GLUCOSE SERPL-MCNC: 194 MG/DL — HIGH (ref 70–99)
HCT VFR BLD CALC: 44.7 % — SIGNIFICANT CHANGE UP (ref 39–50)
HGB BLD-MCNC: 14.2 G/DL — SIGNIFICANT CHANGE UP (ref 13–17)
IMM GRANULOCYTES NFR BLD AUTO: 0.5 % — SIGNIFICANT CHANGE UP (ref 0–1.5)
INR BLD: 0.97 RATIO — SIGNIFICANT CHANGE UP (ref 0.88–1.16)
LACTATE BLDV-MCNC: 1.2 MMOL/L — SIGNIFICANT CHANGE UP (ref 0.7–2)
LYMPHOCYTES # BLD AUTO: 2.07 K/UL — SIGNIFICANT CHANGE UP (ref 1–3.3)
LYMPHOCYTES # BLD AUTO: 27.5 % — SIGNIFICANT CHANGE UP (ref 13–44)
MCHC RBC-ENTMCNC: 29.9 PG — SIGNIFICANT CHANGE UP (ref 27–34)
MCHC RBC-ENTMCNC: 31.8 GM/DL — LOW (ref 32–36)
MCV RBC AUTO: 94.1 FL — SIGNIFICANT CHANGE UP (ref 80–100)
MONOCYTES # BLD AUTO: 0.53 K/UL — SIGNIFICANT CHANGE UP (ref 0–0.9)
MONOCYTES NFR BLD AUTO: 7 % — SIGNIFICANT CHANGE UP (ref 2–14)
NEUTROPHILS # BLD AUTO: 4.67 K/UL — SIGNIFICANT CHANGE UP (ref 1.8–7.4)
NEUTROPHILS NFR BLD AUTO: 62.1 % — SIGNIFICANT CHANGE UP (ref 43–77)
NRBC # BLD: 0 /100 WBCS — SIGNIFICANT CHANGE UP (ref 0–0)
PLATELET # BLD AUTO: 111 K/UL — LOW (ref 150–400)
POTASSIUM SERPL-MCNC: 5 MMOL/L — SIGNIFICANT CHANGE UP (ref 3.5–5.3)
POTASSIUM SERPL-SCNC: 5 MMOL/L — SIGNIFICANT CHANGE UP (ref 3.5–5.3)
PROT SERPL-MCNC: 6.6 G/DL — SIGNIFICANT CHANGE UP (ref 6–8.3)
PROTHROM AB SERPL-ACNC: 11.6 SEC — SIGNIFICANT CHANGE UP (ref 10.6–13.6)
RBC # BLD: 4.75 M/UL — SIGNIFICANT CHANGE UP (ref 4.2–5.8)
RBC # FLD: 15.3 % — HIGH (ref 10.3–14.5)
SODIUM SERPL-SCNC: 143 MMOL/L — SIGNIFICANT CHANGE UP (ref 135–145)
WBC # BLD: 7.53 K/UL — SIGNIFICANT CHANGE UP (ref 3.8–10.5)
WBC # FLD AUTO: 7.53 K/UL — SIGNIFICANT CHANGE UP (ref 3.8–10.5)

## 2020-08-15 PROCEDURE — 85014 HEMATOCRIT: CPT

## 2020-08-15 PROCEDURE — 85610 PROTHROMBIN TIME: CPT

## 2020-08-15 PROCEDURE — 83605 ASSAY OF LACTIC ACID: CPT

## 2020-08-15 PROCEDURE — 82330 ASSAY OF CALCIUM: CPT

## 2020-08-15 PROCEDURE — 82565 ASSAY OF CREATININE: CPT

## 2020-08-15 PROCEDURE — 71045 X-RAY EXAM CHEST 1 VIEW: CPT | Mod: 26

## 2020-08-15 PROCEDURE — 99284 EMERGENCY DEPT VISIT MOD MDM: CPT | Mod: 25

## 2020-08-15 PROCEDURE — 82947 ASSAY GLUCOSE BLOOD QUANT: CPT

## 2020-08-15 PROCEDURE — 85027 COMPLETE CBC AUTOMATED: CPT

## 2020-08-15 PROCEDURE — 85730 THROMBOPLASTIN TIME PARTIAL: CPT

## 2020-08-15 PROCEDURE — 84295 ASSAY OF SERUM SODIUM: CPT

## 2020-08-15 PROCEDURE — 73630 X-RAY EXAM OF FOOT: CPT

## 2020-08-15 PROCEDURE — 71045 X-RAY EXAM CHEST 1 VIEW: CPT

## 2020-08-15 PROCEDURE — 80053 COMPREHEN METABOLIC PANEL: CPT

## 2020-08-15 PROCEDURE — 83880 ASSAY OF NATRIURETIC PEPTIDE: CPT

## 2020-08-15 PROCEDURE — 73630 X-RAY EXAM OF FOOT: CPT | Mod: 26,RT

## 2020-08-15 PROCEDURE — 96360 HYDRATION IV INFUSION INIT: CPT

## 2020-08-15 PROCEDURE — 84132 ASSAY OF SERUM POTASSIUM: CPT

## 2020-08-15 PROCEDURE — 86140 C-REACTIVE PROTEIN: CPT

## 2020-08-15 PROCEDURE — 99284 EMERGENCY DEPT VISIT MOD MDM: CPT

## 2020-08-15 PROCEDURE — 82435 ASSAY OF BLOOD CHLORIDE: CPT

## 2020-08-15 PROCEDURE — 85652 RBC SED RATE AUTOMATED: CPT

## 2020-08-15 PROCEDURE — 82803 BLOOD GASES ANY COMBINATION: CPT

## 2020-08-15 RX ORDER — SODIUM CHLORIDE 9 MG/ML
500 INJECTION INTRAMUSCULAR; INTRAVENOUS; SUBCUTANEOUS ONCE
Refills: 0 | Status: COMPLETED | OUTPATIENT
Start: 2020-08-15 | End: 2020-08-15

## 2020-08-15 RX ADMIN — SODIUM CHLORIDE 500 MILLILITER(S): 9 INJECTION INTRAMUSCULAR; INTRAVENOUS; SUBCUTANEOUS at 15:31

## 2020-08-15 RX ADMIN — SODIUM CHLORIDE 500 MILLILITER(S): 9 INJECTION INTRAMUSCULAR; INTRAVENOUS; SUBCUTANEOUS at 16:11

## 2020-08-15 NOTE — ED PROVIDER NOTE - NSFOLLOWUPINSTRUCTIONS_ED_ALL_ED_FT
- stay hydrated.   - take all home medications as prescribed  - follow up with Dr. Dr. Srinivasan/Kristina in office in the next 1-2 days, call number attached to make an appointment.   - return if symptoms worsen, fevers, weakness, drainage/discharge from callous sites, redness, and all other concerns.

## 2020-08-15 NOTE — ED ADULT NURSE NOTE - PMH
CAD (coronary artery disease)  s/p CABGx1 2009 (LAD)  Diabetes    HLD (hyperlipidemia)    HTN (hypertension)    Marijuana smoker  nightly  Obesity    Smoking  [id number]  Spinal stenosis  L5-S1

## 2020-08-15 NOTE — ED ADULT NURSE REASSESSMENT NOTE - NS ED NURSE REASSESS COMMENT FT1
Pt resting comfortably on stretcher in no distress, repositioned for comfort. Right foot wrapped by podiatry. Safety and comfort measures provided, bed locked and in lowest position, side rails up for safety. Call bell within reach. Awaiting disposition

## 2020-08-15 NOTE — ED PROVIDER NOTE - ATTENDING CONTRIBUTION TO CARE
I saw and evaluated patient with ACP. I discussed H+P and MDM with ACP. I agree with the statements made by the ACP unless otherwise noted.    The care of this patient was in support of the [facility] countermeasures to Covid-19.

## 2020-08-15 NOTE — ED PROVIDER NOTE - OBJECTIVE STATEMENT
66 yo man with PMH of HTN, DMT2 with neuropathy, CAD s/p CABG, right foot abscess s/p I&D presents from home for concern of right foot swelling since yesterday. Pt states he has been feeling well, recently finished course of linezolid last week after d/c from the hospital 66 yo man with PMH of HTN, DMT2 with neuropathy, CAD s/p CABG, right foot abscess s/p I&D presents from home for concern of right foot swelling since yesterday. Pt states he has been feeling well, recently finished course of linezolid last week after d/c from the hospital, has been doing well until yesterday noticed his right foot was more swollen than the left. He took motrin and an extra dose of lasix (prescribed for edema, unknown dose), however swelling persisted until today, called his podiatrist who recommended he come ot the ED for evaluation. Pt denies fevers, chills, increased pain, no drainage or discharge, no calf swelling or hx of DVTs.

## 2020-08-15 NOTE — ED PROVIDER NOTE - CLINICAL SUMMARY MEDICAL DECISION MAKING FREE TEXT BOX
LINUS Faust MD: 66 yo man with PMH of HTN, DMT2 with neuropathy, CAD s/p CABG, right foot abscess s/p I&D presents from home for concern of right foot swelling since yesterday. Pt states he has been feeling well, recently finished course of linezolid last week after d/c from the hospital, has been doing well until yesterday noticed his right foot was more swollen than the left. He took motrin and an extra dose of lasix (prescribed for edema, unknown dose), however swelling persisted until today, called his podiatrist who recommended he come ot the ED for evaluation. Pt denies fevers, chills, increased pain, no drainage or discharge, no calf swelling or hx of DVTs. LE edema is only concentrated to one foot. Ddx includes, however, is not limited to: dependent edema, PVD, infection, other. Do not suspect DVT as edema is foot only, no calf. Plan: basic labs, lactate, XR foot, podiatry consult

## 2020-08-15 NOTE — ED ADULT NURSE NOTE - OBJECTIVE STATEMENT
64 y/o Male presenting to the ED via wheelchair walks with a walker at home, A&Ox3, here for right foot swelling/redness. Pt treated for a diabetic ulcer here recently, was prescribed oral antibiotics and finished the dose, was seen outpatient by podiatrist and reports foot healed. Starting yesterday pt noticed swelling around the area and redness. Edema and slight erythema noted to the dorsal portion of the right foot, warm to touch, pulses present equally in bilateral lower extremities. Pt able to ambulate from wheelchair to bed steadily. Pt denies pain in the foot, reports neuropathy at baseline, pt able to move all digits in effected extremity. Pt afebrile, warm and dry skin noted, normal color for race. Pt denies CP, SOB, fevers, chills, headache, falls, N/V/D, congestion cough. Pt appears well, in no current distress. Safety and comfort measures provided, bed locked and in lowest position, side rails up for safety. Call bell within reach. Awaiting MD evaluation. 66 y/o Male presenting to the ED via wheelchair walks with a walker at home, A&Ox3, here for right foot swelling/redness. Pt treated for a diabetic ulcer here recently, was prescribed oral antibiotics and finished the dose, was seen outpatient by podiatrist and reports foot healed. Pt at home home nurse coming to check the foot once a week, stopped last week. Starting yesterday pt noticed swelling around the area and redness. Edema and slight erythema noted to the dorsal portion of the right foot, warm to touch, pulses present equally in bilateral lower extremities. Pt able to ambulate from wheelchair to bed steadily. Pt denies pain in the foot, reports neuropathy at baseline, pt able to move all digits in effected extremity. Pt afebrile, warm and dry skin noted, normal color for race. Pt denies CP, SOB, fevers, chills, headache, falls, N/V/D, congestion cough. Pt appears well, in no current distress. Safety and comfort measures provided, bed locked and in lowest position, side rails up for safety. Call bell within reach. Awaiting MD evaluation.

## 2020-08-15 NOTE — CONSULT NOTE ADULT - ASSESSMENT
64 y/o M right Foot Submet 3 ulcer to subQ  - pt seen and evaluated  - right foot submet 3, stable ulcer to Sub Q, 0.5 x 0.5 cm, hyperkeratotic rim, no erythema, no purulence, no drainage, no signs of infection, no malodor, etiology diabetic neuropathy  - mild edema noted compared to contralateral foot  - excisional debridement of hyperkeratotic rim of ulcer down to level of dermis but not beyond using a sterile # 10 blade  - ulcer dressed with DSD  - instructed pt to keep clean dry and intact  - f/u with Dr. Srinivasan/Kristina in office [phone]

## 2020-08-15 NOTE — CONSULT NOTE ADULT - SUBJECTIVE AND OBJECTIVE BOX
Podiatry pager #: [phone] ([place])/ [phone] ([facility])    Patient is a 65y old  Male who presents with a chief complaint of right foot swelling.    HPI: 66 yo M presents for concern of right foot swelling since yesterday. Pt states he has been feeling well, recently finished course of linezolid last week after d/c from the hospital, has been doing well until yesterday noticed his right foot was more swollen than the left. He took motrin and an extra dose of lasix (prescribed for edema, unknown dose), however swelling persisted until today, called his podiatrist who recommended he come ot the ED for evaluation. Pt of Dr. Srinivasan, pt is s/p I&D  6/22/20 right foot over 4th, incision is healed and closed. Pt was f/u w/ podiatrist in [place], pt called the podiatrist over phone today and was told to come to the ED for swelling. Denies any trauma.  Denies N/V/F/chills.      PAST MEDICAL & SURGICAL HISTORY:  Marijuana smoker: nightly  Smoking: [id number]  Obesity  Spinal stenosis: L5-S1  Diabetes  HLD (hyperlipidemia)  HTN (hypertension)  CAD (coronary artery disease): s/p CABGx1 2009 (LAD)  S/P spinal fusion: 2007      MEDICATIONS  (STANDING):    MEDICATIONS  (PRN):      Allergies    No Known Allergies    Intolerances        VITALS:    Vital Signs Last 24 Hrs  T(C): 36.5 (15 Aug 2020 13:18), Max: 36.5 (15 Aug 2020 12:05)  T(F): 97.7 (15 Aug 2020 13:18), Max: 97.7 (15 Aug 2020 12:05)  HR: 88 (15 Aug 2020 13:18) (87 - 88)  BP: 131/76 (15 Aug 2020 13:18) (123/75 - 131/76)  BP(mean): --  RR: 18 (15 Aug 2020 13:18) (18 - 18)  SpO2: 99% (15 Aug 2020 13:18) (98% - 99%)    LABS:                          14.2   7.53  )-----------( 111      ( 15 Aug 2020 14:29 )             44.7       08-15    143  |  106  |  25<H>  ----------------------------<  194<H>  5.0   |  26  |  0.69    Ca    9.7      15 Aug 2020 14:29    TPro  6.6  /  Alb  4.0  /  TBili  0.3  /  DBili  x   /  AST  11  /  ALT  7<L>  /  AlkPhos  69  08-15      CAPILLARY BLOOD GLUCOSE          PT/INR - ( 15 Aug 2020 14:29 )   PT: 11.6 sec;   INR: 0.97 ratio         PTT - ( 15 Aug 2020 14:29 )  PTT:25.5 sec    LOWER EXTREMITY PHYSICAL EXAM:    Vascular: DP/PT 1/4 B/L, CFT <3 seconds B/L, Temperature gradient  warm to cool B/L, mild edema dorsal lateral of right foot  Neuro: Epicritic sensation dimished to the level of ankle B/L  Musculoskeletal/Ortho: unremarkable, no pain on palpation of right foot  Skin: right foot submet 3, ulcer to Sub Q, 0.5 x 0.5 cm, no erythema, no purulence, no drainage, no signs of infection, no malodor, etiology diabetic neuropathy    RADIOLOGY & ADDITIONAL STUDIES:    < from: Xray Foot AP + Lateral + Oblique, Right (08.15.20 @ 14:19) >    ******PRELIMINARY REPORT******    ******PRELIMINARY REPORT******          EXAM:  FOOT COMPLETE RIGHT (MIN 3 VIEW)                            PROCEDURE DATE:  08/15/2020      ******PRELIMINARY REPORT******    ******PRELIMINARY REPORT******              INTERPRETATION:  No radiographic evidence of osteomyelitis.    No subQ Air            ******PRELIMINARY REPORT******    ******PRELIMINARY REPORT******          ZEUS JOSEPH M.D., RADIOLOGY RESIDENT    < end of copied text >

## 2020-08-15 NOTE — ED PROVIDER NOTE - PMH
CAD (coronary artery disease)  s/p CABGx1 2009 (LAD)  Diabetes    HLD (hyperlipidemia)    HTN (hypertension)    Marijuana smoker  nightly  Obesity    Smoking  [id number]  Spinal stenosis  L5-S1

## 2020-08-15 NOTE — ED PROVIDER NOTE - CARE PROVIDER_API CALL
Enedina Acevedo  SURGERY  [address]  [id number]  [place] [address]  Phone: [phone]  Fax: [phone]  Follow Up Time: 1-3 Days

## 2020-08-15 NOTE — ED PROVIDER NOTE - PROGRESS NOTE DETAILS
spoke with podiatry resident Dwight who spoke with attending,  pt can leave from their standpoint and follow up asap with Dr. Srinivasan/Lynn. pending repeat lactate. -LEONIDES GillC

## 2020-08-15 NOTE — ED PROVIDER NOTE - PHYSICAL EXAMINATION
A&Ox3, NAD. NCAT. PERRL, EOMI. Neck supple, no LAD. Lungs CTAB. +S1S2, RRR, No m/r/g. Abd soft, NT/ND, +BS, no rebound or guarding. Extremities: R foot with nonpitting edema compared to left, + callus intact to plantar aspect of foot and pinpoint intact callous to dorsal aspect of right foot between 3rd and 4th metacarpal. no bleedings or discharge, no drainage, no ttp to foot, no erythema or warmth. cap refill <2, pulses in distal extremities 4+, no edema. CN II-XII intact. Strength 5/5 UE/LE. Sensations intact throughout. Gait steady.

## 2020-08-15 NOTE — ED PROVIDER NOTE - PATIENT PORTAL LINK FT
You can access the FollowMyHealth Patient Portal offered by [facility] by registering at the following website: [email or web address]. By joining [employer]’s FollowMyHealth portal, you will also be able to view your health information using other applications (apps) compatible with our system.

## 2020-10-15 NOTE — ED ADULT NURSE NOTE - PERIPHERAL VASCULAR
Destruction After The Procedure: No Detail Level: Detailed Electrodesiccation Text: The wound bed was treated with electrodesiccation after the biopsy was performed. Hemostasis: Drysol Cryotherapy Text: The wound bed was treated with cryotherapy after the biopsy was performed. Curettage Text: The wound bed was treated with curettage after the biopsy was performed. Was A Bandage Applied: Yes Consent: Written consent was obtained and risks were reviewed including but not limited to scarring, infection, bleeding, scabbing, incomplete removal, nerve damage and allergy to anesthesia. Anesthesia Volume In Cc: 0.5 Information: Selecting Yes will display possible errors in your note based on the variables you have selected. This validation is only offered as a suggestion for you. PLEASE NOTE THAT THE VALIDATION TEXT WILL BE REMOVED WHEN YOU FINALIZE YOUR NOTE. IF YOU WANT TO FAX A PRELIMINARY NOTE YOU WILL NEED TO TOGGLE THIS TO 'NO' IF YOU DO NOT WANT IT IN YOUR FAXED NOTE. Dressing: bandage Notification Instructions: Patient will be notified of biopsy results. However, patient instructed to call the office if not contacted within 2 weeks. Post-Care Instructions: I reviewed with the patient in detail post-care instructions. Patient is to keep the biopsy site dry overnight, and then apply bacitracin twice daily until healed. Patient may apply hydrogen peroxide soaks to remove any crusting. Silver Nitrate Text: The wound bed was treated with silver nitrate after the biopsy was performed. Type Of Destruction Used: Curettage Additional Anesthesia Volume In Cc (Will Not Render If 0): 0 Depth Of Biopsy: dermis X Size Of Lesion In Cm: 1.1 Anesthesia Type: 1% lidocaine with epinephrine Wound Care: Petrolatum Electrodesiccation And Curettage Text: The wound bed was treated with electrodesiccation and curettage after the biopsy was performed. Biopsy Type: H and E Biopsy Method: Dermablade Billing Type: Third-Party Bill Size Of Lesion In Cm: 2 - - -

## 2023-01-01 NOTE — ED ADULT NURSE NOTE - NSIMPLEMENTINTERV_GEN_ALL_ED
.
Implemented All Universal Safety Interventions:  Victor to call system. Call bell, personal items and telephone within reach. Instruct patient to call for assistance. Room bathroom lighting operational. Non-slip footwear when patient is off stretcher. Physically safe environment: no spills, clutter or unnecessary equipment. Stretcher in lowest position, wheels locked, appropriate side rails in place.

## 2025-07-14 NOTE — ED ADULT TRIAGE NOTE - NS ED TRIAGE AVPU SCALE
Caller: Eliot Harding    Relationship: Self    Best call back number: [phone]     Requested Prescriptions:   Requested Prescriptions     Pending Prescriptions Disp Refills    zolpidem CR (AMBIEN CR) 12.5 MG CR tablet [Pharmacy Med Name: Zolpidem Tartrate ER Oral Tablet Extended Release 12.5 MG] 30 tablet 0     Sig: TAKE 1 TABLET BY MOUTH EVERY DAY AT NIGHT AS NEEDED FOR SLEEP        Pharmacy where request should be sent: [id number] [address] [phone] [facility] [phone]      Last office visit with prescribing clinician: 5/7/2025   Last telemedicine visit with prescribing clinician: Visit date not found   Next office visit with prescribing clinician: 8/12/2025     Additional details provided by patient: PATIENT HAS 2 DAYS LEFT. HE IS GOING OUT OF TOWN ON 07/16/25 AND WILL NEED HIS MEDICATION BEFORE THEN.     Does the patient have less than a 3 day supply:  [x] Yes  [] No    Would you like a call back once the refill request has been completed: [] Yes [x] No    If the office needs to give you a call back, can they leave a voicemail: [] Yes [x] No    Bhavik Dorantes   07/14/25 16:18 EDT           
Alert-The patient is alert, awake and responds to voice. The patient is oriented to time, place, and person. The triage nurse is able to obtain subjective information.